# Patient Record
Sex: FEMALE | Race: BLACK OR AFRICAN AMERICAN | NOT HISPANIC OR LATINO | Employment: FULL TIME | URBAN - METROPOLITAN AREA
[De-identification: names, ages, dates, MRNs, and addresses within clinical notes are randomized per-mention and may not be internally consistent; named-entity substitution may affect disease eponyms.]

---

## 2017-01-30 ENCOUNTER — TELEPHONE (OUTPATIENT)
Dept: FAMILY MEDICINE CLINIC | Facility: CLINIC | Age: 62
End: 2017-01-30

## 2017-01-30 NOTE — TELEPHONE ENCOUNTER
Pt called wanting to know if you can write a letter stating pt medical problems for a reimbursement with [facility]? Stating that she has arthritis in knee and back, obese, and had hip replacement?

## 2019-10-29 ENCOUNTER — APPOINTMENT (OUTPATIENT)
Dept: GENERAL RADIOLOGY | Facility: HOSPITAL | Age: 64
End: 2019-10-29

## 2019-10-29 ENCOUNTER — HOSPITAL ENCOUNTER (EMERGENCY)
Facility: HOSPITAL | Age: 64
Discharge: HOME | End: 2019-10-29
Attending: EMERGENCY MEDICINE | Admitting: EMERGENCY MEDICINE

## 2019-10-29 ENCOUNTER — APPOINTMENT (OUTPATIENT)
Dept: CT IMAGING | Facility: HOSPITAL | Age: 64
End: 2019-10-29

## 2019-10-29 VITALS
RESPIRATION RATE: 16 BRPM | HEIGHT: 63 IN | SYSTOLIC BLOOD PRESSURE: 179 MMHG | HEART RATE: 75 BPM | DIASTOLIC BLOOD PRESSURE: 82 MMHG | OXYGEN SATURATION: 98 % | TEMPERATURE: 98.2 F | BODY MASS INDEX: 59.72 KG/M2
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
## 2019-10-29 DIAGNOSIS — S50.02XA CONTUSION OF LEFT ELBOW, INITIAL ENCOUNTER: ICD-10-CM

## 2019-10-29 DIAGNOSIS — S40.012A CONTUSION OF LEFT SHOULDER, INITIAL ENCOUNTER: ICD-10-CM

## 2019-10-29 DIAGNOSIS — S09.90XA INJURY OF HEAD, INITIAL ENCOUNTER: ICD-10-CM

## 2019-10-29 PROCEDURE — 70450 CT HEAD/BRAIN W/O DYE: CPT

## 2019-10-29 PROCEDURE — 73030 X-RAY EXAM OF SHOULDER: CPT

## 2019-10-29 PROCEDURE — 99283 EMERGENCY DEPT VISIT LOW MDM: CPT

## 2019-10-29 PROCEDURE — 73070 X-RAY EXAM OF ELBOW: CPT

## 2019-10-29 RX ORDER — METHOCARBAMOL 750 MG/1
750 TABLET, FILM COATED ORAL 3 TIMES DAILY
Qty: 15 TABLET | Refills: 0 | Status: SHIPPED | OUTPATIENT
Start: 2019-10-29

## 2019-10-29 RX ORDER — HYDROCODONE BITARTRATE AND ACETAMINOPHEN 7.5; 325 MG/1; MG/1
1 TABLET ORAL ONCE
Status: COMPLETED | OUTPATIENT
Start: 2019-10-29 | End: 2019-10-29

## 2019-10-29 RX ADMIN — HYDROCODONE BITARTRATE AND ACETAMINOPHEN 1 TABLET: 7.5; 325 TABLET ORAL at 22:26

## 2021-03-22 ENCOUNTER — BULK ORDERING (OUTPATIENT)
Dept: CASE MANAGEMENT | Facility: OTHER | Age: 66
End: 2021-03-22

## 2021-03-22 DIAGNOSIS — Z23 IMMUNIZATION DUE: ICD-10-CM

## 2023-11-06 ENCOUNTER — HOSPITAL ENCOUNTER (EMERGENCY)
Facility: HOSPITAL | Age: 68
Discharge: HOME | End: 2023-11-06
Attending: EMERGENCY MEDICINE | Admitting: EMERGENCY MEDICINE
Payer: MEDICARE

## 2023-11-06 VITALS — RESPIRATION RATE: 16 BRPM | HEART RATE: 61 BPM | OXYGEN SATURATION: 98 % | TEMPERATURE: 97.8 F

## 2023-11-06 DIAGNOSIS — H66.90 ACUTE OTITIS MEDIA, UNSPECIFIED OTITIS MEDIA TYPE: Primary | ICD-10-CM

## 2023-11-06 PROCEDURE — 99282 EMERGENCY DEPT VISIT SF MDM: CPT

## 2023-11-06 RX ORDER — AMOXICILLIN 875 MG/1
875 TABLET, COATED ORAL 2 TIMES DAILY
Qty: 10 TABLET | Refills: 0 | Status: SHIPPED | OUTPATIENT
Start: 2023-11-06 | End: 2023-11-11

## 2023-11-06 NOTE — Clinical Note
[facility] EMERGENCY DEPARTMENT  [address] [facility] [address]  Phone: [phone]    Saba Velez was seen and treated in our emergency department on 11/6/2023.  She may return to work on 11/07/2023.         Thank you for choosing [facility].    Sean Junior MD

## 2023-11-06 NOTE — DISCHARGE INSTRUCTIONS
Take medications as prescribed until they are gone.  Follow-up with your primary care doctor for further evaluation.  Return here immediately for any fever over 100.4, chest pain, shortness of breath, or passing out.

## 2023-11-06 NOTE — ED PROVIDER NOTES
EMERGENCY DEPARTMENT ENCOUNTER    Room Number:  13/13  PCP: Oscar Lawrence MD  Patient Care Team:  Oscar Lawrence MD as PCP - General (General Practice)  Cari Singh MD as PCP - Family Medicine   Independent Historians: Patient, family    HPI:  Chief Complaint: Left ear pain    A complete HPI/ROS/PMH/PSH/SH/FH are unobtainable due to: Nothing    Chronic or social conditions impacting patient care (Social Determinants of Health): None  (Financial Resource Strain / Food Insecurity / Transportation Needs / Physical Activity / Stress / Social Connections / Intimate Partner Violence / Housing Stability)    Context: Saba Velez is a 68 y.o. female who presents to the ED c/o acute left ear pain.  The patient reports that she has had pain in her left ear for the last week.  She reports it radiates posteriorly down into her neck.  She denies any chest pain or shortness of breath.  She denies any fever.  She reports when the pain gets severe sometimes it catches her breath.  She has not had any medicine for symptoms.  She has not been evaluated by any physician.  Nothing makes it worse or better.    Review of prior external notes (non-ED) -and- Review of prior external test results outside of this encounter: Extensive review of the EPIC system as well as [facility] reveals no prior visit notes and no prior diagnostic studies available for review.    Prescription drug monitoring program review:         PAST MEDICAL HISTORY  Active Ambulatory Problems     Diagnosis Date Noted    No Active Ambulatory Problems     Resolved Ambulatory Problems     Diagnosis Date Noted    No Resolved Ambulatory Problems     Past Medical History:   Diagnosis Date    Arthritis     Hypertension          PAST SURGICAL HISTORY  Past Surgical History:   Procedure Laterality Date    ABDOMINAL SURGERY      JOINT REPLACEMENT      R hip 2015         FAMILY HISTORY  No family history on file.      SOCIAL HISTORY  Social History      Socioeconomic History    Marital status: Single   Tobacco Use    Smoking status: Never   Substance and Sexual Activity    Alcohol use: No    Drug use: No    Sexual activity: Defer         ALLERGIES  Patient has no known allergies.        REVIEW OF SYSTEMS  Review of Systems  Included in HPI  All systems reviewed and negative except for those discussed in HPI.      PHYSICAL EXAM    I have reviewed the triage vital signs and nursing notes.    ED Triage Vitals [11/06/23 0814]   Temp Heart Rate Resp BP SpO2   97.8 °F (36.6 °C) 77 16 -- 96 %      Temp src Heart Rate Source Patient Position BP Location FiO2 (%)   Tympanic -- -- -- --       Physical Exam  GENERAL: Awake, alert, no acute distress  SKIN: Warm, dry  HENT: Normocephalic, atraumatic.  Left TM is erythematous and bulging.  Right TM is normal.  There is no significant lymphadenopathy in the posterior or anterior cervical chains.  EYES: no scleral icterus  CV: regular rhythm, regular rate  RESPIRATORY: normal effort, lungs clear  ABDOMEN: soft, nontender, nondistended  MUSCULOSKELETAL: no deformity, no calf tenderness or swelling  NEURO: alert, moves all extremities, follows commands                                                               LAB RESULTS  No results found for this or any previous visit (from the past 24 hour(s)).    Ordered the above labs and independently reviewed the results.        RADIOLOGY  No Radiology Exams Resulted Within Past 24 Hours    I ordered the above noted radiological studies. Reviewed by me and discussed with radiologist.  See dictation for official radiology interpretation.      PROCEDURES    Procedures      MEDICATIONS GIVEN IN ER    Medications - No data to display      ORDERS PLACED DURING THIS VISIT:  No orders of the defined types were placed in this encounter.        PROGRESS, DATA ANALYSIS, CONSULTS, AND MEDICAL DECISION MAKING    All labs have been independently interpreted by me.  All radiology studies have been  reviewed by me and discussed with radiologist dictating the report.   EKG's independently viewed and interpreted by me.  Discussion below represents my analysis of pertinent findings related to patient's condition, differential diagnosis, treatment plan and final disposition.    Differential diagnosis includes but is not limited to otitis media, otitis externa, cerumen impaction.    ED Course as of 11/06/23 0832   Mon Nov 06, 2023   0832 The patient has evidence of otitis media.  Her symptoms have been going on for the last week and has been unrelenting.  Plan to start antibiotics and have her follow-up with her primary care doctor.  She is agreeable. [TR]      ED Course User Index  [TR] Sean Junior MD                  AS OF 08:32 EST VITALS:    BP -    HR - 77  TEMP - 97.8 °F (36.6 °C) (Tympanic)  O2 SATS - 96%        DIAGNOSIS  Final diagnoses:   Acute otitis media, unspecified otitis media type         DISPOSITION  ED Disposition       ED Disposition   Discharge    Condition   Stable    Comment   --                  Note Disclaimer: At [facility], we believe that sharing information builds trust and better relationships. You are receiving this note because you recently visited [facility]. It is possible you will see health information before a provider has talked with you about it. This kind of information can be easy to misunderstand. To help you fully understand what it means for your health, we urge you to discuss this note with your provider.         Sean Junior MD  11/06/23 0832

## 2024-09-01 ENCOUNTER — HOSPITAL ENCOUNTER (INPATIENT)
Facility: HOSPITAL | Age: 69
LOS: 1 days | Discharge: HOME | End: 2024-09-03
Attending: EMERGENCY MEDICINE | Admitting: HOSPITALIST
Payer: MEDICARE

## 2024-09-01 ENCOUNTER — APPOINTMENT (OUTPATIENT)
Dept: GENERAL RADIOLOGY | Facility: HOSPITAL | Age: 69
End: 2024-09-01
Payer: MEDICARE

## 2024-09-01 ENCOUNTER — APPOINTMENT (OUTPATIENT)
Dept: ULTRASOUND IMAGING | Facility: HOSPITAL | Age: 69
End: 2024-09-01
Payer: MEDICARE

## 2024-09-01 DIAGNOSIS — R53.1 WEAKNESS: ICD-10-CM

## 2024-09-01 DIAGNOSIS — N18.9 CHRONIC RENAL IMPAIRMENT, UNSPECIFIED CKD STAGE: ICD-10-CM

## 2024-09-01 DIAGNOSIS — N39.0 ACUTE UTI: ICD-10-CM

## 2024-09-01 DIAGNOSIS — R11.0 INTRACTABLE NAUSEA: Primary | ICD-10-CM

## 2024-09-01 DIAGNOSIS — R63.4 WEIGHT LOSS: ICD-10-CM

## 2024-09-01 DIAGNOSIS — E87.6 HYPOKALEMIA: ICD-10-CM

## 2024-09-01 PROBLEM — M19.90 ARTHRITIS: Status: ACTIVE | Noted: 2021-08-19

## 2024-09-01 PROBLEM — N17.9 ACUTE RENAL FAILURE: Status: ACTIVE | Noted: 2024-09-01

## 2024-09-01 PROBLEM — E66.9 OBESITY: Status: ACTIVE | Noted: 2021-11-15

## 2024-09-01 PROBLEM — G47.33 OBSTRUCTIVE SLEEP APNEA: Status: ACTIVE | Noted: 2024-09-01

## 2024-09-01 PROBLEM — R82.71 ASYMPTOMATIC BACTERIURIA: Status: ACTIVE | Noted: 2024-09-01

## 2024-09-01 PROBLEM — R68.81 EARLY SATIETY: Status: ACTIVE | Noted: 2024-09-01

## 2024-09-01 PROBLEM — I10 HYPERTENSION: Status: ACTIVE | Noted: 2024-09-01

## 2024-09-01 PROBLEM — R73.9 HYPERGLYCEMIA: Status: ACTIVE | Noted: 2024-09-01

## 2024-09-01 LAB
ALBUMIN SERPL-MCNC: 3.7 G/DL (ref 3.5–5.2)
ALBUMIN/GLOB SERPL: 1.3 G/DL
ALP SERPL-CCNC: 66 U/L (ref 39–117)
ALT SERPL W P-5'-P-CCNC: 25 U/L (ref 1–33)
ANION GAP SERPL CALCULATED.3IONS-SCNC: 12.3 MMOL/L (ref 5–15)
AST SERPL-CCNC: 35 U/L (ref 1–32)
BACTERIA UR QL AUTO: ABNORMAL /HPF
BASOPHILS # BLD AUTO: 0.04 10*3/MM3 (ref 0–0.2)
BASOPHILS NFR BLD AUTO: 0.5 % (ref 0–1.5)
BILIRUB SERPL-MCNC: 0.8 MG/DL (ref 0–1.2)
BILIRUB UR QL STRIP: NEGATIVE
BUN SERPL-MCNC: 25 MG/DL (ref 8–23)
BUN/CREAT SERPL: 13.7 (ref 7–25)
CALCIUM SPEC-SCNC: 9.5 MG/DL (ref 8.6–10.5)
CHLORIDE SERPL-SCNC: 94 MMOL/L (ref 98–107)
CLARITY UR: ABNORMAL
CO2 SERPL-SCNC: 32.7 MMOL/L (ref 22–29)
COLOR UR: YELLOW
CREAT SERPL-MCNC: 1.82 MG/DL (ref 0.57–1)
CREAT UR-MCNC: 172.1 MG/DL
D-LACTATE SERPL-SCNC: 1.6 MMOL/L (ref 0.5–2)
DEPRECATED RDW RBC AUTO: 42.4 FL (ref 37–54)
EGFRCR SERPLBLD CKD-EPI 2021: 29.4 ML/MIN/1.73
EOSINOPHIL # BLD AUTO: 0.08 10*3/MM3 (ref 0–0.4)
EOSINOPHIL NFR BLD AUTO: 1 % (ref 0.3–6.2)
EOSINOPHIL SPEC QL MICRO: 0 % EOS/100 CELLS (ref 0–0)
ERYTHROCYTE [DISTWIDTH] IN BLOOD BY AUTOMATED COUNT: 12.6 % (ref 12.3–15.4)
GEN 5 2HR TROPONIN T REFLEX: 28 NG/L
GLOBULIN UR ELPH-MCNC: 2.9 GM/DL
GLUCOSE SERPL-MCNC: 110 MG/DL (ref 65–99)
GLUCOSE UR STRIP-MCNC: NEGATIVE MG/DL
HCT VFR BLD AUTO: 34.5 % (ref 34–46.6)
HGB BLD-MCNC: 12 G/DL (ref 12–15.9)
HGB UR QL STRIP.AUTO: ABNORMAL
HOLD SPECIMEN: NORMAL
HOLD SPECIMEN: NORMAL
HYALINE CASTS UR QL AUTO: ABNORMAL /LPF
IMM GRANULOCYTES # BLD AUTO: 0.02 10*3/MM3 (ref 0–0.05)
IMM GRANULOCYTES NFR BLD AUTO: 0.3 % (ref 0–0.5)
KETONES UR QL STRIP: NEGATIVE
LEUKOCYTE ESTERASE UR QL STRIP.AUTO: ABNORMAL
LIPASE SERPL-CCNC: 13 U/L (ref 13–60)
LYMPHOCYTES # BLD AUTO: 1.39 10*3/MM3 (ref 0.7–3.1)
LYMPHOCYTES NFR BLD AUTO: 17.5 % (ref 19.6–45.3)
MAGNESIUM SERPL-MCNC: 2.4 MG/DL (ref 1.6–2.4)
MCH RBC QN AUTO: 32.4 PG (ref 26.6–33)
MCHC RBC AUTO-ENTMCNC: 34.8 G/DL (ref 31.5–35.7)
MCV RBC AUTO: 93.2 FL (ref 79–97)
MONOCYTES # BLD AUTO: 0.65 10*3/MM3 (ref 0.1–0.9)
MONOCYTES NFR BLD AUTO: 8.2 % (ref 5–12)
NEUTROPHILS NFR BLD AUTO: 5.76 10*3/MM3 (ref 1.7–7)
NEUTROPHILS NFR BLD AUTO: 72.5 % (ref 42.7–76)
NITRITE UR QL STRIP: POSITIVE
NRBC BLD AUTO-RTO: 0 /100 WBC (ref 0–0.2)
OSMOLALITY UR: 318 MOSM/KG
PH UR STRIP.AUTO: 5.5 [PH] (ref 5–8)
PLATELET # BLD AUTO: 306 10*3/MM3 (ref 140–450)
PMV BLD AUTO: 9 FL (ref 6–12)
POTASSIUM SERPL-SCNC: 2.2 MMOL/L (ref 3.5–5.2)
PROT SERPL-MCNC: 6.6 G/DL (ref 6–8.5)
PROT UR QL STRIP: ABNORMAL
QT INTERVAL: 453 MS
QTC INTERVAL: 445 MS
RBC # BLD AUTO: 3.7 10*6/MM3 (ref 3.77–5.28)
RBC # UR STRIP: ABNORMAL /HPF
REF LAB TEST METHOD: ABNORMAL
SODIUM SERPL-SCNC: 139 MMOL/L (ref 136–145)
SODIUM UR-SCNC: 38 MMOL/L
SP GR UR STRIP: 1.01 (ref 1–1.03)
SQUAMOUS #/AREA URNS HPF: ABNORMAL /HPF
T4 FREE SERPL-MCNC: 1.16 NG/DL (ref 0.92–1.68)
TROPONIN T DELTA: -2 NG/L
TROPONIN T SERPL HS-MCNC: 30 NG/L
TSH SERPL DL<=0.05 MIU/L-ACNC: 1.14 UIU/ML (ref 0.27–4.2)
URATE SERPL-MCNC: 12.2 MG/DL (ref 2.4–5.7)
UROBILINOGEN UR QL STRIP: ABNORMAL
UUN 24H UR-MCNC: 368 MG/DL
WBC # UR STRIP: ABNORMAL /HPF
WBC NRBC COR # BLD AUTO: 7.94 10*3/MM3 (ref 3.4–10.8)
WHOLE BLOOD HOLD COAG: NORMAL
WHOLE BLOOD HOLD SPECIMEN: NORMAL

## 2024-09-01 PROCEDURE — 87186 SC STD MICRODIL/AGAR DIL: CPT | Performed by: EMERGENCY MEDICINE

## 2024-09-01 PROCEDURE — 81001 URINALYSIS AUTO W/SCOPE: CPT | Performed by: EMERGENCY MEDICINE

## 2024-09-01 PROCEDURE — 84484 ASSAY OF TROPONIN QUANT: CPT | Performed by: EMERGENCY MEDICINE

## 2024-09-01 PROCEDURE — 82570 ASSAY OF URINE CREATININE: CPT | Performed by: HOSPITALIST

## 2024-09-01 PROCEDURE — 80053 COMPREHEN METABOLIC PANEL: CPT | Performed by: EMERGENCY MEDICINE

## 2024-09-01 PROCEDURE — 99285 EMERGENCY DEPT VISIT HI MDM: CPT

## 2024-09-01 PROCEDURE — 84443 ASSAY THYROID STIM HORMONE: CPT | Performed by: EMERGENCY MEDICINE

## 2024-09-01 PROCEDURE — 83935 ASSAY OF URINE OSMOLALITY: CPT | Performed by: HOSPITALIST

## 2024-09-01 PROCEDURE — 25010000002 CEFTRIAXONE PER 250 MG: Performed by: EMERGENCY MEDICINE

## 2024-09-01 PROCEDURE — 87088 URINE BACTERIA CULTURE: CPT | Performed by: EMERGENCY MEDICINE

## 2024-09-01 PROCEDURE — 85025 COMPLETE CBC W/AUTO DIFF WBC: CPT | Performed by: EMERGENCY MEDICINE

## 2024-09-01 PROCEDURE — 93010 ELECTROCARDIOGRAM REPORT: CPT | Performed by: INTERNAL MEDICINE

## 2024-09-01 PROCEDURE — 87086 URINE CULTURE/COLONY COUNT: CPT | Performed by: EMERGENCY MEDICINE

## 2024-09-01 PROCEDURE — 71045 X-RAY EXAM CHEST 1 VIEW: CPT

## 2024-09-01 PROCEDURE — 84439 ASSAY OF FREE THYROXINE: CPT | Performed by: EMERGENCY MEDICINE

## 2024-09-01 PROCEDURE — 25010000002 HEPARIN (PORCINE) PER 1000 UNITS: Performed by: HOSPITALIST

## 2024-09-01 PROCEDURE — 36415 COLL VENOUS BLD VENIPUNCTURE: CPT

## 2024-09-01 PROCEDURE — 83690 ASSAY OF LIPASE: CPT | Performed by: EMERGENCY MEDICINE

## 2024-09-01 PROCEDURE — 84550 ASSAY OF BLOOD/URIC ACID: CPT | Performed by: HOSPITALIST

## 2024-09-01 PROCEDURE — 76705 ECHO EXAM OF ABDOMEN: CPT

## 2024-09-01 PROCEDURE — 83735 ASSAY OF MAGNESIUM: CPT | Performed by: EMERGENCY MEDICINE

## 2024-09-01 PROCEDURE — 84540 ASSAY OF URINE/UREA-N: CPT | Performed by: HOSPITALIST

## 2024-09-01 PROCEDURE — G0378 HOSPITAL OBSERVATION PER HR: HCPCS

## 2024-09-01 PROCEDURE — 83605 ASSAY OF LACTIC ACID: CPT | Performed by: EMERGENCY MEDICINE

## 2024-09-01 PROCEDURE — 25810000003 SODIUM CHLORIDE 0.9 % SOLUTION: Performed by: EMERGENCY MEDICINE

## 2024-09-01 PROCEDURE — 84300 ASSAY OF URINE SODIUM: CPT | Performed by: HOSPITALIST

## 2024-09-01 PROCEDURE — 87205 SMEAR GRAM STAIN: CPT | Performed by: HOSPITALIST

## 2024-09-01 PROCEDURE — 93005 ELECTROCARDIOGRAM TRACING: CPT | Performed by: EMERGENCY MEDICINE

## 2024-09-01 RX ORDER — POLYETHYLENE GLYCOL 3350 17 G/17G
17 POWDER, FOR SOLUTION ORAL DAILY PRN
Status: DISCONTINUED | OUTPATIENT
Start: 2024-09-01 | End: 2024-09-03 | Stop reason: HOSPADM

## 2024-09-01 RX ORDER — BISACODYL 10 MG
10 SUPPOSITORY, RECTAL RECTAL DAILY PRN
Status: DISCONTINUED | OUTPATIENT
Start: 2024-09-01 | End: 2024-09-03 | Stop reason: HOSPADM

## 2024-09-01 RX ORDER — POTASSIUM CHLORIDE 750 MG/1
40 TABLET, FILM COATED, EXTENDED RELEASE ORAL ONCE
Status: COMPLETED | OUTPATIENT
Start: 2024-09-01 | End: 2024-09-01

## 2024-09-01 RX ORDER — VIT C/ZINC CITRATE/ELDERBERRY 45 MG-50MG
TABLET,CHEWABLE ORAL
COMMUNITY

## 2024-09-01 RX ORDER — ACETAMINOPHEN 160 MG/5ML
650 SOLUTION ORAL EVERY 4 HOURS PRN
Status: DISCONTINUED | OUTPATIENT
Start: 2024-09-01 | End: 2024-09-03 | Stop reason: HOSPADM

## 2024-09-01 RX ORDER — SODIUM CHLORIDE 0.9 % (FLUSH) 0.9 %
10 SYRINGE (ML) INJECTION EVERY 12 HOURS SCHEDULED
Status: DISCONTINUED | OUTPATIENT
Start: 2024-09-01 | End: 2024-09-03 | Stop reason: HOSPADM

## 2024-09-01 RX ORDER — POTASSIUM CHLORIDE 750 MG/1
40 TABLET, FILM COATED, EXTENDED RELEASE ORAL ONCE
Status: COMPLETED | OUTPATIENT
Start: 2024-09-02 | End: 2024-09-01

## 2024-09-01 RX ORDER — ACETAMINOPHEN 650 MG/1
650 SUPPOSITORY RECTAL EVERY 4 HOURS PRN
Status: DISCONTINUED | OUTPATIENT
Start: 2024-09-01 | End: 2024-09-03 | Stop reason: HOSPADM

## 2024-09-01 RX ORDER — METOPROLOL SUCCINATE 25 MG/1
1 TABLET, EXTENDED RELEASE ORAL DAILY
COMMUNITY
Start: 2024-04-16

## 2024-09-01 RX ORDER — AMOXICILLIN 250 MG
2 CAPSULE ORAL 2 TIMES DAILY PRN
Status: DISCONTINUED | OUTPATIENT
Start: 2024-09-01 | End: 2024-09-03 | Stop reason: HOSPADM

## 2024-09-01 RX ORDER — SODIUM CHLORIDE 0.9 % (FLUSH) 0.9 %
10 SYRINGE (ML) INJECTION AS NEEDED
Status: DISCONTINUED | OUTPATIENT
Start: 2024-09-01 | End: 2024-09-03 | Stop reason: HOSPADM

## 2024-09-01 RX ORDER — ONDANSETRON 2 MG/ML
4 INJECTION INTRAMUSCULAR; INTRAVENOUS EVERY 6 HOURS PRN
Status: DISCONTINUED | OUTPATIENT
Start: 2024-09-01 | End: 2024-09-03 | Stop reason: HOSPADM

## 2024-09-01 RX ORDER — METOPROLOL SUCCINATE 25 MG/1
25 TABLET, EXTENDED RELEASE ORAL DAILY
Status: DISCONTINUED | OUTPATIENT
Start: 2024-09-01 | End: 2024-09-03

## 2024-09-01 RX ORDER — SODIUM CHLORIDE 9 MG/ML
40 INJECTION, SOLUTION INTRAVENOUS AS NEEDED
Status: DISCONTINUED | OUTPATIENT
Start: 2024-09-01 | End: 2024-09-03 | Stop reason: HOSPADM

## 2024-09-01 RX ORDER — ASPIRIN 81 MG/1
81 TABLET ORAL DAILY
Status: DISCONTINUED | OUTPATIENT
Start: 2024-09-01 | End: 2024-09-03 | Stop reason: HOSPADM

## 2024-09-01 RX ORDER — BISACODYL 5 MG/1
5 TABLET, DELAYED RELEASE ORAL DAILY PRN
Status: DISCONTINUED | OUTPATIENT
Start: 2024-09-01 | End: 2024-09-03 | Stop reason: HOSPADM

## 2024-09-01 RX ORDER — ACETAMINOPHEN 325 MG/1
650 TABLET ORAL EVERY 4 HOURS PRN
Status: DISCONTINUED | OUTPATIENT
Start: 2024-09-01 | End: 2024-09-03 | Stop reason: HOSPADM

## 2024-09-01 RX ORDER — NITROGLYCERIN 0.4 MG/1
0.4 TABLET SUBLINGUAL
Status: DISCONTINUED | OUTPATIENT
Start: 2024-09-01 | End: 2024-09-03 | Stop reason: HOSPADM

## 2024-09-01 RX ORDER — FAMOTIDINE 20 MG/1
20 TABLET, FILM COATED ORAL
Status: DISCONTINUED | OUTPATIENT
Start: 2024-09-02 | End: 2024-09-03 | Stop reason: HOSPADM

## 2024-09-01 RX ORDER — FAMOTIDINE 20 MG/1
20 TABLET, FILM COATED ORAL 3 TIMES DAILY
COMMUNITY

## 2024-09-01 RX ORDER — ASPIRIN 81 MG/1
1 TABLET ORAL DAILY
COMMUNITY

## 2024-09-01 RX ORDER — IBUPROFEN 800 MG/1
1 TABLET, FILM COATED ORAL 2 TIMES DAILY WITH MEALS
COMMUNITY
End: 2024-09-03 | Stop reason: HOSPADM

## 2024-09-01 RX ORDER — HYDROCHLOROTHIAZIDE 25 MG/1
1 TABLET ORAL DAILY
COMMUNITY
Start: 2015-03-02 | End: 2024-09-03 | Stop reason: HOSPADM

## 2024-09-01 RX ORDER — ONDANSETRON 4 MG/1
4 TABLET, ORALLY DISINTEGRATING ORAL EVERY 6 HOURS PRN
Status: DISCONTINUED | OUTPATIENT
Start: 2024-09-01 | End: 2024-09-03 | Stop reason: HOSPADM

## 2024-09-01 RX ORDER — HEPARIN SODIUM 5000 [USP'U]/ML
5000 INJECTION, SOLUTION INTRAVENOUS; SUBCUTANEOUS EVERY 12 HOURS SCHEDULED
Status: DISCONTINUED | OUTPATIENT
Start: 2024-09-01 | End: 2024-09-03 | Stop reason: HOSPADM

## 2024-09-01 RX ADMIN — SODIUM CHLORIDE 500 ML: 9 INJECTION, SOLUTION INTRAVENOUS at 16:42

## 2024-09-01 RX ADMIN — ASPIRIN 81 MG: 81 TABLET, COATED ORAL at 20:21

## 2024-09-01 RX ADMIN — METOPROLOL SUCCINATE 25 MG: 25 TABLET, EXTENDED RELEASE ORAL at 23:00

## 2024-09-01 RX ADMIN — POTASSIUM CHLORIDE 40 MEQ: 750 TABLET, EXTENDED RELEASE ORAL at 20:21

## 2024-09-01 RX ADMIN — POTASSIUM CHLORIDE 40 MEQ: 750 TABLET, EXTENDED RELEASE ORAL at 16:46

## 2024-09-01 RX ADMIN — HEPARIN SODIUM 5000 UNITS: 5000 INJECTION INTRAVENOUS; SUBCUTANEOUS at 20:21

## 2024-09-01 RX ADMIN — Medication 10 ML: at 23:01

## 2024-09-01 RX ADMIN — POTASSIUM CHLORIDE 40 MEQ: 750 TABLET, EXTENDED RELEASE ORAL at 23:01

## 2024-09-01 RX ADMIN — SERTRALINE 50 MG: 50 TABLET, FILM COATED ORAL at 23:00

## 2024-09-01 RX ADMIN — CEFTRIAXONE 2000 MG: 2 INJECTION, POWDER, FOR SOLUTION INTRAMUSCULAR; INTRAVENOUS at 16:43

## 2024-09-01 NOTE — ED NOTES
Nursing report ED to floor  Saab Velez  69 y.o.  female    HPI :  HPI (Adult)  Stated Reason for Visit: nausea, weight loss    Chief Complaint  Chief Complaint   Patient presents with    Nausea    Weight Loss       Admitting doctor:   Timothy Giang MD    Admitting diagnosis:   The primary encounter diagnosis was Intractable nausea. Diagnoses of Hypokalemia, Acute UTI, Weight loss, Weakness, and Chronic renal impairment, unspecified CKD stage were also pertinent to this visit.    Code status:   Current Code Status       Date Active Code Status Order ID Comments User Context       Not on file            Allergies:   Patient has no known allergies.    Isolation:   No active isolations    Intake and Output  No intake or output data in the 24 hours ending 09/01/24 1805    Weight:       09/01/24  1507   Weight: 111 kg (244 lb)       Most recent vitals:   Vitals:    09/01/24 1731 09/01/24 1735 09/01/24 1737 09/01/24 1801   BP: 113/77   112/63   Pulse: 57 65 65 61   Resp:       Temp:       SpO2: 98% 100% 100% 100%   Weight:       Height:           Active LDAs/IV Access:   Lines, Drains & Airways       Active LDAs       Name Placement date Placement time Site Days    Peripheral IV 09/01/24 1641 Right Antecubital 09/01/24  1641  Antecubital  less than 1                    Labs (abnormal labs have a star):   Labs Reviewed   COMPREHENSIVE METABOLIC PANEL - Abnormal; Notable for the following components:       Result Value    Glucose 110 (*)     BUN 25 (*)     Creatinine 1.82 (*)     Potassium 2.2 (*)     Chloride 94 (*)     CO2 32.7 (*)     AST (SGOT) 35 (*)     eGFR 29.4 (*)     All other components within normal limits    Narrative:     GFR Normal >60  Chronic Kidney Disease <60  Kidney Failure <15    The GFR formula is only valid for adults with stable renal function between ages 18 and 70.   URINALYSIS W/ MICROSCOPIC IF INDICATED (NO CULTURE) - Abnormal; Notable for the following components:    Appearance, UA  Turbid (*)     Blood, UA Trace (*)     Protein, UA 30 mg/dL (1+) (*)     Leuk Esterase, UA Large (3+) (*)     Nitrite, UA Positive (*)     All other components within normal limits   CBC WITH AUTO DIFFERENTIAL - Abnormal; Notable for the following components:    RBC 3.70 (*)     Lymphocyte % 17.5 (*)     All other components within normal limits   URINALYSIS, MICROSCOPIC ONLY - Abnormal; Notable for the following components:    RBC, UA 11-20 (*)     WBC, UA Too Numerous to Count (*)     Bacteria, UA 4+ (*)     Squamous Epithelial Cells, UA 13-20 (*)     All other components within normal limits   TROPONIN - Abnormal; Notable for the following components:    HS Troponin T 30 (*)     All other components within normal limits    Narrative:     High Sensitive Troponin T Reference Range:  <14.0 ng/L- Negative Female for AMI  <22.0 ng/L- Negative Male for AMI  >=14 - Abnormal Female indicating possible myocardial injury.  >=22 - Abnormal Male indicating possible myocardial injury.   Clinicians would have to utilize clinical acumen, EKG, Troponin, and serial changes to determine if it is an Acute Myocardial Infarction or myocardial injury due to an underlying chronic condition.        LIPASE - Normal   LACTIC ACID, PLASMA - Normal   MAGNESIUM - Normal   TSH - Normal   T4, FREE - Normal   URINE CULTURE   RAINBOW DRAW    Narrative:     The following orders were created for panel order [place] Draw.  Procedure                               Abnormality         Status                     ---------                               -----------         ------                     Green Top (Gel)[[id number]]                                  Final result               Lavender Top[[id number]]                                     Final result               Gold Top - SST[[id number]]                                   Final result               Light Blue Top[[id number]]                                   Final result                 Please view  results for these tests on the individual orders.   HIGH SENSITIVITIY TROPONIN T 2HR   CBC AND DIFFERENTIAL    Narrative:     The following orders were created for panel order CBC & Differential.  Procedure                               Abnormality         Status                     ---------                               -----------         ------                     CBC Auto Differential[[id number]]        Abnormal            Final result                 Please view results for these tests on the individual orders.   GREEN TOP   LAVENDER TOP   GOLD TOP - SST   LIGHT BLUE TOP       EKG:   ECG 12 Lead Electrolyte Imbalance   Preliminary Result   HEART RATE=58  bpm   RR Lndvfsgg=4629  ms   MS Sglumtjo=362  ms   P Horizontal Axis=1  deg   P Front Axis=-10  deg   QRSD Mceeouoe=428  ms   QT Bpsrdpzp=996  ms   BDtM=357  ms   QRS Axis=19  deg   T Wave Axis=-6  deg   - ABNORMAL ECG -   Sinus rhythm   Prolonged MS interval   Low voltage, precordial leads   Date and Time of Study:2024-09-01 17:01:55          Meds given in ED:   Medications   sodium chloride 0.9 % flush 10 mL (has no administration in time range)   Potassium Replacement - Follow Nurse / BPA Driven Protocol (has no administration in time range)   sodium chloride 0.9 % bolus 500 mL (500 mL Intravenous New Bag 9/1/24 1642)   cefTRIAXone (ROCEPHIN) 2,000 mg in sodium chloride 0.9 % 100 mL MBP (2,000 mg Intravenous New Bag 9/1/24 1643)   potassium chloride (K-DUR,KLOR-CON) ER tablet 40 mEq (40 mEq Oral Given 9/1/24 1646)       Imaging results:  No radiology results for the last day    Ambulatory status:   - w/ assist    Social issues:   Social History     Socioeconomic History    Marital status: Single   Tobacco Use    Smoking status: Never   Substance and Sexual Activity    Alcohol use: No    Drug use: No    Sexual activity: Defer       Peripheral Neurovascular  Peripheral Neurovascular (Adult)  Peripheral Neurovascular WDL: WDL    Neuro Cognitive  Neuro Cognitive  (Adult)  Cognitive/Neuro/Behavioral WDL: WDL    Learning  Learning Assessment (Adult)  Learning Readiness and Ability: no barriers identified    Respiratory  Respiratory (Adult)  Airway WDL: WDL  Respiratory WDL  Respiratory WDL: WDL    Abdominal Pain       Pain Assessments  Pain (Adult)  (0-10) Pain Rating: Rest: 0  (0-10) Pain Rating: Activity: 0    NIH Stroke Scale       Wesley Washington RN  09/01/24 18:05 EDT

## 2024-09-01 NOTE — H&P
Name: Saba eVlez ADMIT: 2024   : 1955  PCP: Oscar Lawrence MD    MRN: [id number] LOS: 0 days   AGE/SEX: 69 y.o. female  ROOM: [facility]/     Chief Complaint   Patient presents with    Nausea    Weight Loss       Subjective   Patient is a 69 y.o. female who presents to [facility] with the above chief complaint.  She presents today because she felt like she needed to have someone else evaluate her problems.  She seen her primary care doctor 5 or 6 times this year for these complaints.  She recently had a CT scan done outpatient.  She is not happy with the workup has been provided and the answer she has been given so she presented to the emergency room this evening.  She has lost about 60 or more pounds since January of this year.  She describes early satiety where she eats a couple bites and then is just too full to eat anymore.  She denies having much of an appetite for anything really.  Has been increasingly weak and not been getting around as well due to the weakness.  She stopped driving she has been confused at times.  She denies abdominal discomfort other than just a nagging aching sensation.  She denies any history of chronic kidney disease denies any history of coronary artery disease stroke or diabetes.  She takes HCTZ for blood pressure and takes ibuprofen 3 times daily for her arthritis.  In the ER she was found to have low potassium and was admitted to our service for stabilization and evaluation      History of Present Illness    Past Medical History:   Diagnosis Date    Arthritis     Hypertension      Past Surgical History:   Procedure Laterality Date    ABDOMINAL SURGERY      JOINT REPLACEMENT      R hip 2015     History reviewed. No pertinent family history.  Social History     Tobacco Use    Smoking status: Never     Passive exposure: Never    Smokeless tobacco: Never   Vaping Use    Vaping status: Never Used   Substance Use Topics    Alcohol use: No    Drug use: No      Medications Prior to Admission   Medication Sig Dispense Refill Last Dose    Ascorbic Acid (VITAMIN C PO) Take  by mouth.   8/31/2024    aspirin 81 MG EC tablet Take 1 tablet by mouth Daily.   8/31/2024    Elderberry-Vitamin C-Zinc ([employer]) 50-45-3.8 MG chewable tablet Chew.   8/31/2024    famotidine (PEPCID) 20 MG tablet Take 1 tablet by mouth 3 times a day.   8/31/2024    hydroCHLOROthiazide 25 MG tablet Take 1 tablet by mouth Daily.   8/31/2024    ibuprofen (ADVIL,MOTRIN) 800 MG tablet Take 1 tablet by mouth 2 (Two) Times a Day With Meals.   8/31/2024    metoprolol succinate XL (TOPROL-XL) 25 MG 24 hr tablet Take 1 tablet by mouth Daily.   8/31/2024    multivitamin with minerals (ONE-A-DAY WOMENS PO) Take 1 tablet by mouth Daily.   8/31/2024    sertraline (ZOLOFT) 50 MG tablet Take 1 tablet by mouth Daily.   8/31/2024    methocarbamol (ROBAXIN) 750 MG tablet Take 1 tablet by mouth 3 (Three) Times a Day. (Patient not taking: Reported on 9/1/2024) 15 tablet 0 Not Taking     Allergies:  Patient has no known allergies.    Review of Systems     Objective    Vital Signs  Temp:  [98.1 °F (36.7 °C)-98.2 °F (36.8 °C)] 98.2 °F (36.8 °C)  Heart Rate:  [57-97] 68  Resp:  [16-18] 18  BP: ()/(51-84) 113/70  SpO2:  [98 %-100 %] 100 %  on   ;   Device (Oxygen Therapy): room air  Body mass index is 41.1 kg/m².    Physical Exam    Results Review:   I reviewed the patient's new clinical results.  Results from last 7 days   Lab Units 09/01/24  1524   WBC 10*3/mm3 7.94   HEMOGLOBIN g/dL 12.0   PLATELETS 10*3/mm3 306     Results from last 7 days   Lab Units 09/01/24  1524   SODIUM mmol/L 139   POTASSIUM mmol/L 2.2*   CHLORIDE mmol/L 94*   CO2 mmol/L 32.7*   BUN mg/dL 25*   CREATININE mg/dL 1.82*   GLUCOSE mg/dL 110*   ALBUMIN g/dL 3.7   BILIRUBIN mg/dL 0.8   ALK PHOS U/L 66   AST (SGOT) U/L 35*   ALT (SGPT) U/L 25   Estimated Creatinine Clearance: 34.2 mL/min (A) (by C-G formula based on SCr of 1.82  "mg/dL (H)).  Results from last 7 days   Lab Units 09/01/24  1831 09/01/24  1524   HSTROP T ng/L 28* 30*         Invalid input(s): \"LDLCALC\"  Results from last 7 days   Lab Units 09/01/24  1533   NITRITE UA  Positive*   WBC UA /HPF Too Numerous to Count*   BACTERIA UA /HPF 4+*   SQUAM EPITHEL UA /HPF 13-20*     XR Chest 1 View   Final Result      NM HIDA SCAN WITH PHARMACOLOGICAL INTERVENTION    (Results Pending)   US Gallbladder    (Results Pending)     Assessment & Plan       Hypokalemia    Asymptomatic bacteriuria    Acute renal failure    Hyperglycemia    Obstructive sleep apnea    Weight loss    Early satiety      Assessment & Plan  This is a 69-year-old lady with a history of hypertension obesity arthritis who presents to the hospital with weight loss and abdominal issues was found to have hypokalemia  -Work to replace potassium.  -Not really sure what to think about her elevated creatinine.  I feel that this is probably acute renal failure.  Just a few days ago her creatinine was 2.8 and is down to 1.8 today.  Of asked her to avoid ibuprofen and will continue to trend her creatinine here in the hospital.  Of also asked nephrology to evaluate the patient and will plan to avoid any other nephrotoxic medications.  Will check urine electrolytes.  CT scan 3 days ago did not show any urinary obstruction.  -As far as the abdominal discomfort weight loss goes we will check a HIDA scan.  Her CT scan a few days ago did show some sludge and stones within the gallbladder.  It certainly possible the biliary colic could be causing some of her symptoms.  It is also possibly related to gastroparesis or other significant findings.  She will need outpatient referral to GI for EGD  -Mechanical DVT prophylaxis  -Full code      I discussed the patients findings and my recommendations with patient, family, and nursing staff.          Timothy Giang MD  [facility]  09/01/24  19:48 EDT      "

## 2024-09-01 NOTE — ED NOTES
"Pt c/o generalized fatigue and weakness x6-7 months, also reports decreased food intake (states .5-1 meal a day). Has seen PCP for same problem, states \"one of my blood numbers was too off so they couldn't give me what they wanted\". Pt ambulatory at home \"to get around\", but states otherwise unable to walk much. Denies any acute symptoms at this time.   "

## 2024-09-01 NOTE — ED PROVIDER NOTES
EMERGENCY DEPARTMENT ENCOUNTER    Room Number:  35/35  Date seen:  9/1/2024  PCP: Oscar Lawrence MD  Historian: Patient      HPI:  Chief Complaint: Nausea and weight loss  Context: Saba Velez is a 69 y.o. female who presents to the ED c/o nausea and weight loss.  She states she is lost 65 pounds since January.  Patient now states she is feeling fatigued.  The patient denies any abdominal pain, fevers or chills.      PAST MEDICAL HISTORY  Active Ambulatory Problems     Diagnosis Date Noted    No Active Ambulatory Problems     Resolved Ambulatory Problems     Diagnosis Date Noted    No Resolved Ambulatory Problems     Past Medical History:   Diagnosis Date    Arthritis     Hypertension          REVIEW OF SYSTEMS  All systems reviewed and negative except for those discussed in HPI.       PAST SURGICAL HISTORY  Past Surgical History:   Procedure Laterality Date    ABDOMINAL SURGERY      JOINT REPLACEMENT      R hip 2015         FAMILY HISTORY  No family history on file.      SOCIAL HISTORY  Social History     Socioeconomic History    Marital status: Single   Tobacco Use    Smoking status: Never   Substance and Sexual Activity    Alcohol use: No    Drug use: No    Sexual activity: Defer         ALLERGIES  Patient has no known allergies.      PHYSICAL EXAM  ED Triage Vitals   Temp Heart Rate Resp BP SpO2   09/01/24 1507 09/01/24 1507 09/01/24 1507 09/01/24 1535 09/01/24 1507   98.1 °F (36.7 °C) 97 16 133/84 98 %      Temp src Heart Rate Source Patient Position BP Location FiO2 (%)   -- -- -- -- --              Physical Exam      GENERAL: 69-year-old female patient in no acute distress  HENT: NCAT: nares patent: Neck supple  EYES: no scleral icterus  CV: regular rhythm, normal rate  RESPIRATORY: normal effort  ABDOMEN: soft, NTND: Bowel sounds positive  MUSCULOSKELETAL: no deformity  NEURO: alert with nonfocal neuro exam  PSYCH:  calm, cooperative  SKIN: warm, dry    Vital signs and nursing notes  reviewed.      LAB RESULTS  Recent Results (from the past 24 hour(s))   Comprehensive Metabolic Panel    Collection Time: 09/01/24  3:24 PM    Specimen: Arm, Left; Blood   Result Value Ref Range    Glucose 110 (H) 65 - 99 mg/dL    BUN 25 (H) 8 - 23 mg/dL    Creatinine 1.82 (H) 0.57 - 1.00 mg/dL    Sodium 139 136 - 145 mmol/L    Potassium 2.2 (C) 3.5 - 5.2 mmol/L    Chloride 94 (L) 98 - 107 mmol/L    CO2 32.7 (H) 22.0 - 29.0 mmol/L    Calcium 9.5 8.6 - 10.5 mg/dL    Total Protein 6.6 6.0 - 8.5 g/dL    Albumin 3.7 3.5 - 5.2 g/dL    ALT (SGPT) 25 1 - 33 U/L    AST (SGOT) 35 (H) 1 - 32 U/L    Alkaline Phosphatase 66 39 - 117 U/L    Total Bilirubin 0.8 0.0 - 1.2 mg/dL    Globulin 2.9 gm/dL    A/G Ratio 1.3 g/dL    BUN/Creatinine Ratio 13.7 7.0 - 25.0    Anion Gap 12.3 5.0 - 15.0 mmol/L    eGFR 29.4 (L) >60.0 mL/min/1.73   Lipase    Collection Time: 09/01/24  3:24 PM    Specimen: Arm, Left; Blood   Result Value Ref Range    Lipase 13 13 - 60 U/L   Lactic Acid, Plasma    Collection Time: 09/01/24  3:24 PM    Specimen: Arm, Left; Blood   Result Value Ref Range    Lactate 1.6 0.5 - 2.0 mmol/L   Green Top (Gel)    Collection Time: 09/01/24  3:24 PM   Result Value Ref Range    Extra Tube Hold for add-ons.    Lavender Top    Collection Time: 09/01/24  3:24 PM   Result Value Ref Range    Extra Tube hold for add-on    Gold Top - SST    Collection Time: 09/01/24  3:24 PM   Result Value Ref Range    Extra Tube Hold for add-ons.    Light Blue Top    Collection Time: 09/01/24  3:24 PM   Result Value Ref Range    Extra Tube Hold for add-ons.    CBC Auto Differential    Collection Time: 09/01/24  3:24 PM    Specimen: Arm, Left; Blood   Result Value Ref Range    WBC 7.94 3.40 - 10.80 10*3/mm3    RBC 3.70 (L) 3.77 - 5.28 10*6/mm3    Hemoglobin 12.0 12.0 - 15.9 g/dL    Hematocrit 34.5 34.0 - 46.6 %    MCV 93.2 79.0 - 97.0 fL    MCH 32.4 26.6 - 33.0 pg    MCHC 34.8 31.5 - 35.7 g/dL    RDW 12.6 12.3 - 15.4 %    RDW-SD 42.4 37.0 - 54.0 fl     MPV 9.0 6.0 - 12.0 fL    Platelets 306 140 - 450 10*3/mm3    Neutrophil % 72.5 42.7 - 76.0 %    Lymphocyte % 17.5 (L) 19.6 - 45.3 %    Monocyte % 8.2 5.0 - 12.0 %    Eosinophil % 1.0 0.3 - 6.2 %    Basophil % 0.5 0.0 - 1.5 %    Immature Grans % 0.3 0.0 - 0.5 %    Neutrophils, Absolute 5.76 1.70 - 7.00 10*3/mm3    Lymphocytes, Absolute 1.39 0.70 - 3.10 10*3/mm3    Monocytes, Absolute 0.65 0.10 - 0.90 10*3/mm3    Eosinophils, Absolute 0.08 0.00 - 0.40 10*3/mm3    Basophils, Absolute 0.04 0.00 - 0.20 10*3/mm3    Immature Grans, Absolute 0.02 0.00 - 0.05 10*3/mm3    nRBC 0.0 0.0 - 0.2 /100 WBC   Magnesium    Collection Time: 09/01/24  3:24 PM    Specimen: Arm, Left; Blood   Result Value Ref Range    Magnesium 2.4 1.6 - 2.4 mg/dL   TSH    Collection Time: 09/01/24  3:24 PM    Specimen: Arm, Left; Blood   Result Value Ref Range    TSH 1.140 0.270 - 4.200 uIU/mL   T4, Free    Collection Time: 09/01/24  3:24 PM    Specimen: Arm, Left; Blood   Result Value Ref Range    Free T4 1.16 0.92 - 1.68 ng/dL   High Sensitivity Troponin T    Collection Time: 09/01/24  3:24 PM    Specimen: Arm, Left; Blood   Result Value Ref Range    HS Troponin T 30 (H) <14 ng/L   Urinalysis With Microscopic If Indicated (No Culture) - Urine, Clean Catch    Collection Time: 09/01/24  3:33 PM    Specimen: Urine, Clean Catch   Result Value Ref Range    Color, UA Yellow Yellow, Straw    Appearance, UA Turbid (A) Clear    pH, UA 5.5 5.0 - 8.0    Specific Gravity, UA 1.013 1.005 - 1.030    Glucose, UA Negative Negative    Ketones, UA Negative Negative    Bilirubin, UA Negative Negative    Blood, UA Trace (A) Negative    Protein, UA 30 mg/dL (1+) (A) Negative    Leuk Esterase, UA Large (3+) (A) Negative    Nitrite, UA Positive (A) Negative    Urobilinogen, UA 0.2 E.U./dL 0.2 - 1.0 E.U./dL   Urinalysis, Microscopic Only - Urine, Clean Catch    Collection Time: 09/01/24  3:33 PM    Specimen: Urine, Clean Catch   Result Value Ref Range    RBC, UA 11-20 (A)  None Seen, 0-2 /HPF    WBC, UA Too Numerous to Count (A) None Seen, 0-2 /HPF    Bacteria, UA 4+ (A) None Seen /HPF    Squamous Epithelial Cells, UA 13-20 (A) None Seen, 0-2 /HPF    Hyaline Casts, UA 7-12 None Seen /LPF    Methodology Automated Microscopy    ECG 12 Lead Electrolyte Imbalance    Collection Time: 09/01/24  5:01 PM   Result Value Ref Range    QT Interval 453 ms    QTC Interval 445 ms       Ordered the above labs and reviewed the results.        RADIOLOGY  XR Chest 1 View    Result Date: 9/1/2024  XR CHEST 1 VW-  HISTORY: 69-year-old female with shortness of breath. Nausea and weight loss.  FINDINGS: Limited apical lordotic projection. No definite pneumonia or CHF is seen. Follow-up with 2 views of the chest is recommended.  This report was finalized on 9/1/2024 4:42 PM by Dr. Altagracia Garcia M.D on Workstation: [id number]       Ordered the above noted radiological studies. Reviewed by me in PACS.            PROCEDURES  Procedures          MEDICATIONS GIVEN IN ER  Medications   sodium chloride 0.9 % flush 10 mL (has no administration in time range)   Potassium Replacement - Follow Nurse / BPA Driven Protocol (has no administration in time range)   sodium chloride 0.9 % bolus 500 mL (0 mL Intravenous Stopped 9/1/24 1825)   cefTRIAXone (ROCEPHIN) 2,000 mg in sodium chloride 0.9 % 100 mL MBP (0 mg Intravenous Stopped 9/1/24 1825)   potassium chloride (K-DUR,KLOR-CON) ER tablet 40 mEq (40 mEq Oral Given 9/1/24 1646)             MEDICAL DECISION MAKING, PROGRESS, and CONSULTS    All labs have been independently reviewed by me.  All radiology studies have been reviewed by me and I have also reviewed the radiology report.   EKG's independently viewed and interpreted by me.  Discussion below represents my analysis of pertinent findings related to patient's condition, differential diagnosis, treatment plan and final disposition.      Additional sources:  - Discussed/ obtained information from independent  historians: The patient's daughter is here who states that the patient has not been eating well for months    - External (non-ED) record review: I reviewed a creatinine on the patient from several days ago that was elevated at 2.8    - Chronic or social conditions impacting care: Patient is from home    - Shared decision making: After shared decision-making discussion we agree she needs to be admitted to the hospital for further evaluation and care      Orders placed during this visit:  Orders Placed This Encounter   Procedures    Urine Culture - Urine,    XR Chest 1 View    NM HIDA SCAN WITH PHARMACOLOGICAL INTERVENTION    US Gallbladder    Cameron Draw    Comprehensive Metabolic Panel    Lipase    Urinalysis With Microscopic If Indicated (No Culture) - Urine, Clean Catch    Lactic Acid, Plasma    CBC Auto Differential    Urinalysis, Microscopic Only - Urine, Clean Catch    Magnesium    TSH    T4, Free    High Sensitivity Troponin T    High Sensitivity Troponin T 2Hr    NPO Diet NPO Type: Strict NPO    Undress & Gown    Monitor Blood Pressure    Continuous Pulse Oximetry    Code Status and Medical Interventions: CPR (Attempt to Resuscitate); Full Support    LHA (on-call MD unless specified) Details    Inpatient Nephrology Consult    ECG 12 Lead Electrolyte Imbalance    Insert Peripheral IV    Initiate Observation Status    CBC & Differential    Green Top (Gel)    Lavender Top    Gold Top - SST    Light Blue Top           Independent interpretation of labs, radiology studies, and discussions with consultants:  ED Course as of 09/01/24 1838   Sun Sep 01, 2024   1614 The patient does have an acute UTI.  I will send off a urine culture and treat her with Rocephin.  Her white count and lactic acid are normal. [GP]   1626 The patient's potassium is 2.2.  I will give her oral potassium and IV potassium per the electrolyte replacement protocol.  I will check an EKG for this. [GP]   1652 My independent interpretation of the  patient's chest x-ray is no CHF or pneumonia [GP]   1709 EKG    EKG time: 1701  Rhythm/Rate: Normal sinus rhythm at 58  Low voltage  Normal intervals  No Acute Ischemia  Non-Specific ST-T changes  No old EKG for comparison    Interpreted Contemporaneously by me.  Independently viewed by me     [GP]   1657 I discussed the case with Dr. Gonzalez to admit the patient to a telemetry bed for her UTI and hypokalemia. [GP]      ED Course User Index  [GP] Hermann Hernandez MD               DIAGNOSIS  Final diagnoses:   Intractable nausea   Hypokalemia   Acute UTI   Weight loss   Weakness   Chronic renal impairment, unspecified CKD stage         DISPOSITION  ADMISSION    Discussed treatment plan and reason for admission with pt/family and admitting physician.  Pt/family voiced understanding of the plan for admission for further testing/treatment as needed.            Latest Documented Vital Signs:  As of 18:38 EDT  BP- 112/63 HR- 61 Temp- 98.1 °F (36.7 °C) O2 sat- 100%--      --------------------  Please note that portions of this were completed with a voice recognition program.       Note Disclaimer: At [facility], we believe that sharing information builds trust and better relationships. You are receiving this note because you are receiving care at [facility] or recently visited. It is possible you will see health information before a provider has talked with you about it. This kind of information can be easy to misunderstand. To help you fully understand what it means for your health, we urge you to discuss this note with your provider.             Hermann Hernandez MD  09/01/24 0077

## 2024-09-01 NOTE — PROGRESS NOTES
Clinical Pharmacy Services: Medication History    Saba Velez is a 69 y.o. female presenting to [facility] for No admission diagnoses are documented for this encounter.    She  has a past medical history of Arthritis and Hypertension.    Allergies as of 09/01/2024    (No Known Allergies)       Medication information was obtained from: Patient home med list  Pharmacy and Phone Number:     Prior to Admission Medications       Prescriptions Last Dose Informant Patient Reported? Taking?    Ascorbic Acid (VITAMIN C PO) 8/31/2024 Self Yes Yes    Take  by mouth.    aspirin 81 MG EC tablet 8/31/2024 Self Yes Yes    Take 1 tablet by mouth Daily.    Elderberry-Vitamin C-Zinc ("[employer] Gummy) 50-45-3.8 MG chewable tablet 8/31/2024 Self Yes Yes    Chew.    famotidine (PEPCID) 20 MG tablet 8/31/2024 Self Yes Yes    Take 1 tablet by mouth 3 times a day.    hydroCHLOROthiazide 25 MG tablet 8/31/2024 Self Yes Yes    Take 1 tablet by mouth Daily.    ibuprofen (ADVIL,MOTRIN) 800 MG tablet 8/31/2024 Self Yes Yes    Take 1 tablet by mouth 2 (Two) Times a Day With Meals.    metoprolol succinate XL (TOPROL-XL) 25 MG 24 hr tablet 8/31/2024 Self Yes Yes    Take 1 tablet by mouth Daily.    multivitamin with minerals (ONE-A-DAY WOMENS PO) 8/31/2024 Self Yes Yes    Take 1 tablet by mouth Daily.    sertraline (ZOLOFT) 50 MG tablet 8/31/2024 Self Yes Yes    Take 1 tablet by mouth Daily.    methocarbamol (ROBAXIN) 750 MG tablet Not Taking  No No    Take 1 tablet by mouth 3 (Three) Times a Day.    Patient not taking:  Reported on 9/1/2024              Medication notes: Patient has not taken any medications today, last dose yesterday.     This medication list is complete to the best of my knowledge as of 9/1/2024    Please call if questions.    Adama Douglas  Pharmacy Intern  Phone [phone]  9/1/2024 17:10 EDT

## 2024-09-01 NOTE — ED TRIAGE NOTES
Patient to ED via pv from home. Patient c/o nausea loss of 65lbs since January. Patient reports she has been seen for this issue but wants another opinion.

## 2024-09-02 ENCOUNTER — APPOINTMENT (OUTPATIENT)
Dept: NUCLEAR MEDICINE | Facility: HOSPITAL | Age: 69
End: 2024-09-02
Payer: MEDICARE

## 2024-09-02 LAB
ALBUMIN SERPL-MCNC: 3.2 G/DL (ref 3.5–5.2)
ANION GAP SERPL CALCULATED.3IONS-SCNC: 10 MMOL/L (ref 5–15)
ANION GAP SERPL CALCULATED.3IONS-SCNC: 6 MMOL/L (ref 5–15)
ARTERIAL PATENCY WRIST A: POSITIVE
ATMOSPHERIC PRESS: 755 MMHG
BASE EXCESS BLDA CALC-SCNC: 6.3 MMOL/L (ref 0–2)
BDY SITE: ABNORMAL
BUN SERPL-MCNC: 18 MG/DL (ref 8–23)
BUN SERPL-MCNC: 22 MG/DL (ref 8–23)
BUN/CREAT SERPL: 15.3 (ref 7–25)
BUN/CREAT SERPL: 15.6 (ref 7–25)
CALCIUM SPEC-SCNC: 8.8 MG/DL (ref 8.6–10.5)
CALCIUM SPEC-SCNC: 9 MG/DL (ref 8.6–10.5)
CHLORIDE SERPL-SCNC: 100 MMOL/L (ref 98–107)
CHLORIDE SERPL-SCNC: 101 MMOL/L (ref 98–107)
CO2 BLDA-SCNC: 31.4 MMOL/L (ref 23–27)
CO2 SERPL-SCNC: 29 MMOL/L (ref 22–29)
CO2 SERPL-SCNC: 32 MMOL/L (ref 22–29)
CREAT SERPL-MCNC: 1.18 MG/DL (ref 0.57–1)
CREAT SERPL-MCNC: 1.41 MG/DL (ref 0.57–1)
DEPRECATED RDW RBC AUTO: 43.8 FL (ref 37–54)
EGFRCR SERPLBLD CKD-EPI 2021: 40 ML/MIN/1.73
EGFRCR SERPLBLD CKD-EPI 2021: 49.5 ML/MIN/1.73
ERYTHROCYTE [DISTWIDTH] IN BLOOD BY AUTOMATED COUNT: 12.8 % (ref 12.3–15.4)
GLUCOSE SERPL-MCNC: 104 MG/DL (ref 65–99)
GLUCOSE SERPL-MCNC: 111 MG/DL (ref 65–99)
HCO3 BLDA-SCNC: 30.2 MMOL/L (ref 22–28)
HCT VFR BLD AUTO: 30.8 % (ref 34–46.6)
HEMODILUTION: NO
HGB BLD-MCNC: 10.6 G/DL (ref 12–15.9)
MAGNESIUM SERPL-MCNC: 2.4 MG/DL (ref 1.6–2.4)
MCH RBC QN AUTO: 32.3 PG (ref 26.6–33)
MCHC RBC AUTO-ENTMCNC: 34.4 G/DL (ref 31.5–35.7)
MCV RBC AUTO: 93.9 FL (ref 79–97)
MODALITY: ABNORMAL
PCO2 BLDA: 39.8 MM HG (ref 35–45)
PH BLDA: 7.49 PH UNITS (ref 7.35–7.45)
PHOSPHATE SERPL-MCNC: 2 MG/DL (ref 2.5–4.5)
PHOSPHATE SERPL-MCNC: 2.7 MG/DL (ref 2.5–4.5)
PLATELET # BLD AUTO: 277 10*3/MM3 (ref 140–450)
PMV BLD AUTO: 9.1 FL (ref 6–12)
PO2 BLDA: 62.2 MM HG (ref 80–100)
POTASSIUM SERPL-SCNC: 2.6 MMOL/L (ref 3.5–5.2)
POTASSIUM SERPL-SCNC: 2.7 MMOL/L (ref 3.5–5.2)
POTASSIUM SERPL-SCNC: 3.3 MMOL/L (ref 3.5–5.2)
RBC # BLD AUTO: 3.28 10*6/MM3 (ref 3.77–5.28)
SAO2 % BLDCOA: 93.1 % (ref 92–98.5)
SODIUM SERPL-SCNC: 138 MMOL/L (ref 136–145)
SODIUM SERPL-SCNC: 140 MMOL/L (ref 136–145)
TOTAL RATE: 18 BREATHS/MINUTE
WBC NRBC COR # BLD AUTO: 8.4 10*3/MM3 (ref 3.4–10.8)

## 2024-09-02 PROCEDURE — 84100 ASSAY OF PHOSPHORUS: CPT | Performed by: HOSPITALIST

## 2024-09-02 PROCEDURE — 85027 COMPLETE CBC AUTOMATED: CPT | Performed by: HOSPITALIST

## 2024-09-02 PROCEDURE — 97530 THERAPEUTIC ACTIVITIES: CPT

## 2024-09-02 PROCEDURE — 80048 BASIC METABOLIC PNL TOTAL CA: CPT | Performed by: HOSPITALIST

## 2024-09-02 PROCEDURE — 25010000002 POTASSIUM CHLORIDE PER 2 MEQ OF POTASSIUM: Performed by: HOSPITALIST

## 2024-09-02 PROCEDURE — 25810000003 LACTATED RINGERS PER 1000 ML: Performed by: HOSPITALIST

## 2024-09-02 PROCEDURE — 97162 PT EVAL MOD COMPLEX 30 MIN: CPT

## 2024-09-02 PROCEDURE — 84132 ASSAY OF SERUM POTASSIUM: CPT | Performed by: HOSPITALIST

## 2024-09-02 PROCEDURE — 80069 RENAL FUNCTION PANEL: CPT | Performed by: HOSPITALIST

## 2024-09-02 PROCEDURE — 83735 ASSAY OF MAGNESIUM: CPT | Performed by: HOSPITALIST

## 2024-09-02 PROCEDURE — 25010000002 HEPARIN (PORCINE) PER 1000 UNITS: Performed by: HOSPITALIST

## 2024-09-02 PROCEDURE — 36600 WITHDRAWAL OF ARTERIAL BLOOD: CPT

## 2024-09-02 PROCEDURE — 82803 BLOOD GASES ANY COMBINATION: CPT

## 2024-09-02 PROCEDURE — A9537 TC99M MEBROFENIN: HCPCS | Performed by: HOSPITALIST

## 2024-09-02 PROCEDURE — 78227 HEPATOBIL SYST IMAGE W/DRUG: CPT

## 2024-09-02 PROCEDURE — 25010000002 SINCALIDE PER 5 MCG: Performed by: HOSPITALIST

## 2024-09-02 PROCEDURE — 25010000002 POTASSIUM CHLORIDE 10 MEQ/100ML SOLUTION: Performed by: HOSPITALIST

## 2024-09-02 PROCEDURE — 0 TECHNETIUM TC 99M MEBROFENIN KIT: Performed by: HOSPITALIST

## 2024-09-02 RX ORDER — POTASSIUM CHLORIDE 750 MG/1
40 TABLET, FILM COATED, EXTENDED RELEASE ORAL
Status: COMPLETED | OUTPATIENT
Start: 2024-09-02 | End: 2024-09-02

## 2024-09-02 RX ORDER — POTASSIUM CHLORIDE 7.45 MG/ML
10 INJECTION INTRAVENOUS
Status: DISPENSED | OUTPATIENT
Start: 2024-09-02 | End: 2024-09-02

## 2024-09-02 RX ORDER — POTASSIUM CHLORIDE 750 MG/1
40 TABLET, FILM COATED, EXTENDED RELEASE ORAL EVERY 4 HOURS
Status: COMPLETED | OUTPATIENT
Start: 2024-09-02 | End: 2024-09-03

## 2024-09-02 RX ORDER — KIT FOR THE PREPARATION OF TECHNETIUM TC 99M MEBROFENIN 45 MG/10ML
1 INJECTION, POWDER, LYOPHILIZED, FOR SOLUTION INTRAVENOUS
Status: COMPLETED | OUTPATIENT
Start: 2024-09-02 | End: 2024-09-02

## 2024-09-02 RX ADMIN — POTASSIUM CHLORIDE 10 MEQ: 7.46 INJECTION, SOLUTION INTRAVENOUS at 08:27

## 2024-09-02 RX ADMIN — POTASSIUM CHLORIDE: 2 INJECTION, SOLUTION, CONCENTRATE INTRAVENOUS at 20:03

## 2024-09-02 RX ADMIN — POTASSIUM CHLORIDE 10 MEQ: 7.46 INJECTION, SOLUTION INTRAVENOUS at 09:36

## 2024-09-02 RX ADMIN — ASPIRIN 81 MG: 81 TABLET, COATED ORAL at 15:38

## 2024-09-02 RX ADMIN — POTASSIUM CHLORIDE 40 MEQ: 750 TABLET, EXTENDED RELEASE ORAL at 23:35

## 2024-09-02 RX ADMIN — HEPARIN SODIUM 5000 UNITS: 5000 INJECTION INTRAVENOUS; SUBCUTANEOUS at 21:23

## 2024-09-02 RX ADMIN — POTASSIUM CHLORIDE 10 MEQ: 7.46 INJECTION, SOLUTION INTRAVENOUS at 10:21

## 2024-09-02 RX ADMIN — FAMOTIDINE 20 MG: 20 TABLET, FILM COATED ORAL at 17:09

## 2024-09-02 RX ADMIN — METOPROLOL SUCCINATE 25 MG: 25 TABLET, EXTENDED RELEASE ORAL at 15:38

## 2024-09-02 RX ADMIN — POTASSIUM CHLORIDE 10 MEQ: 7.46 INJECTION, SOLUTION INTRAVENOUS at 06:03

## 2024-09-02 RX ADMIN — POTASSIUM CHLORIDE 40 MEQ: 750 TABLET, EXTENDED RELEASE ORAL at 15:38

## 2024-09-02 RX ADMIN — POTASSIUM CHLORIDE 40 MEQ: 750 TABLET, EXTENDED RELEASE ORAL at 14:46

## 2024-09-02 RX ADMIN — Medication 10 ML: at 15:40

## 2024-09-02 RX ADMIN — SERTRALINE 50 MG: 50 TABLET, FILM COATED ORAL at 15:38

## 2024-09-02 RX ADMIN — SINCALIDE 2.2 MCG: 5 INJECTION, POWDER, LYOPHILIZED, FOR SOLUTION INTRAVENOUS at 12:29

## 2024-09-02 RX ADMIN — MEBROFENIN 1 DOSE: 45 INJECTION, POWDER, LYOPHILIZED, FOR SOLUTION INTRAVENOUS at 11:25

## 2024-09-02 RX ADMIN — POTASSIUM CHLORIDE 10 MEQ: 7.46 INJECTION, SOLUTION INTRAVENOUS at 06:04

## 2024-09-02 RX ADMIN — POTASSIUM, SODIUM PHOSPHATES 280 MG-160 MG-250 MG ORAL POWDER PACKET 2 PACKET: POWDER IN PACKET at 18:30

## 2024-09-02 NOTE — PROGRESS NOTES
Name: Saba Velez ADMIT: 2024   : 1955  PCP: Oscar Lawrence MD    MRN: [id number] LOS: 0 days   AGE/SEX: 69 y.o. female  ROOM: [facility]     Subjective   Subjective   She reports feeling better today.  No specific complaints.       Objective   Objective   Vital Signs  Temp:  [98.1 °F (36.7 °C)-99.1 °F (37.3 °C)] 99.1 °F (37.3 °C)  Heart Rate:  [57-97] 61  Resp:  [16-18] 18  BP: ()/(51-84) 90/57  SpO2:  [98 %-100 %] 98 %  on   ;   Device (Oxygen Therapy): room air  Body mass index is 41.02 kg/m².  Physical Exam  Vitals and nursing note reviewed.   Constitutional:       General: She is not in acute distress.     Appearance: She is obese.   Cardiovascular:      Rate and Rhythm: Normal rate and regular rhythm.   Pulmonary:      Effort: Pulmonary effort is normal.      Breath sounds: Normal breath sounds.   Abdominal:      General: Bowel sounds are normal.      Palpations: Abdomen is soft.      Tenderness: There is no abdominal tenderness.   Musculoskeletal:         General: No swelling.   Skin:     General: Skin is warm and dry.   Neurological:      Mental Status: She is alert. Mental status is at baseline.       Results Review     I reviewed the patient's new clinical results.  Results from last 7 days   Lab Units 24  0304 24  1524   WBC 10*3/mm3 8.40 7.94   HEMOGLOBIN g/dL 10.6* 12.0   PLATELETS 10*3/mm3 277 306     Results from last 7 days   Lab Units 24  0304 24  1524   SODIUM mmol/L 138 139   POTASSIUM mmol/L 2.6* 2.2*   CHLORIDE mmol/L 100 94*   CO2 mmol/L 32.0* 32.7*   BUN mg/dL 22 25*   CREATININE mg/dL 1.41* 1.82*   GLUCOSE mg/dL 104* 110*   EGFR mL/min/1.73 40.0* 29.4*     Results from last 7 days   Lab Units 24  1524   ALBUMIN g/dL 3.7   BILIRUBIN mg/dL 0.8   ALK PHOS U/L 66   AST (SGOT) U/L 35*   ALT (SGPT) U/L 25     Results from last 7 days   Lab Units 24  0304 24  1524   CALCIUM mg/dL 8.8 9.5   ALBUMIN g/dL  --  3.7   MAGNESIUM mg/dL 2.4  "2.4   PHOSPHORUS mg/dL 2.7  --      Results from last 7 days   Lab Units 09/01/24  1524   LACTATE mmol/L 1.6     No results found for: \"HGBA1C\", \"POCGLU\"    No radiology results for the last day    I have personally reviewed all medications:  Scheduled Medications  aspirin, 81 mg, Oral, Daily  famotidine, 20 mg, Oral, BID AC  heparin (porcine), 5,000 Units, Subcutaneous, Q12H  metoprolol succinate XL, 25 mg, Oral, Daily  potassium chloride, 40 mEq, Oral, Q2H  sertraline, 50 mg, Oral, Daily  sodium chloride, 10 mL, Intravenous, Q12H    Infusions  lactated ringers 980 mL with potassium chloride 40 mEq infusion,     Diet  NPO Diet NPO Type: Strict NPO    I have personally reviewed:  [x]  Laboratory   [x]  Microbiology   [x]  Radiology   [x]  EKG/Telemetry  [x]  Cardiology/Vascular   []  Pathology    []  Records       Assessment/Plan     Active Hospital Problems    Diagnosis  POA    **Hypokalemia [E87.6]  Yes    Asymptomatic bacteriuria [R82.71]  Yes    Acute kidney injury [N17.9]  Yes    Obstructive sleep apnea [G47.33]  Yes    Weight loss [R63.4]  Yes    Early satiety [R68.81]  Yes      Resolved Hospital Problems   No resolved problems to display.       69 y.o. female admitted with Hypokalemia.    Discussed with nephrology.  Creatinine continues to trend better on IV fluids.  Gallbladder ultrasound and HIDA scan both pending.  Reviewed noncontrasted CT scan from 8/30.  No explanation for weight loss on that study though noncontrasted.  Per my discussion with renal will continue fluids today and consider contrasted CT tomorrow if renal function improving.  May benefit from bidirectional endoscopy as an outpatient.  Weight loss has been going on since January.     Placing potassium  Follow-up gallbladder ultrasound and HIDA      Heparin SC for DVT prophylaxis.  Full code.  Discussed with patient and consulting provider.  Anticipate discharge home in 1-2 days.  Expected Discharge Date: 9/4/2024; Expected Discharge Time: "       Sudarshan Posey MD  [facility]  09/02/24  12:31 EDT

## 2024-09-02 NOTE — SIGNIFICANT NOTE
09/02/24 1537   OTHER   Discipline occupational therapist   Rehab Time/Intention   Session Not Performed other (see comments)  (Pt ambulated SBA with rwx 100' and reports no issues with ADLs or functional mobility. OT to sign off. Please reconsult if status changes.)

## 2024-09-02 NOTE — PLAN OF CARE
Goal Outcome Evaluation:  Plan of Care Reviewed With: patient           Outcome Evaluation: 70 y/o female pt reports to ED on 9/1 w/ c/o nausea as well as generalized fatigue/weakness with 65lb weight loss over the last 6-7 months. Found hypokalemia and JACQUI, workup still ongoing. Pt reports at baseline she lives in single story home with her adult son, is IND with all ADLs and mobility, denies any mobility and safety concerns at her house. Today at evaluation, she performs all bed mobility with SBA, stands, and amb 100' with rwx/SBA, declines further mobility. She has no overt LOB throughout, does utilize rwx so encouraged pt to use AD at home should she feel necessary at d/c. She appears to be at her baseline status with overall generalized weakness however appears safe to return to PLOF at d/c. At this time, she feels she is safe for return home, no acute PT needs, will s/o. Please re-consult if status changes. Encouraged increased ambulation throughout the day with nursing staff.      Anticipated Discharge Disposition (PT): home with assist

## 2024-09-02 NOTE — CASE MANAGEMENT/SOCIAL WORK
Continued Stay Note  [facility]     Patient Name: Saba Velez  MRN: [id number]  Today's Date: 9/2/2024    Admit Date: 9/1/2024        Discharge Plan       Row Name 09/02/24 1409       Plan    Plan Comments Attempted to screen, patient not in room. OSMAN left at bedside.                   Discharge Codes    No documentation.                 Expected Discharge Date and Time       Expected Discharge Date Expected Discharge Time    Sep 4, 2024               Leona Shields RN

## 2024-09-02 NOTE — PLAN OF CARE
Problem: Adult Inpatient Plan of Care  Goal: Plan of Care Review  9/2/2024 0036 by Rmaón Abdullahi RN  Outcome: Ongoing, Progressing  Flowsheets (Taken 9/2/2024 0036)  Plan of Care Reviewed With: patient  9/2/2024 0035 by Ramón Abdullahi RN  Outcome: Ongoing, Progressing  Flowsheets (Taken 9/2/2024 0035)  Plan of Care Reviewed With: patient     Problem: Electrolyte Imbalance  Goal: Electrolyte Balance  Outcome: Ongoing, Progressing     Problem: Dysrhythmia  Goal: Normalized Cardiac Rhythm  Outcome: Ongoing, Progressing     Problem: UTI (Urinary Tract Infection)  Goal: Improved Infection Symptoms  Outcome: Ongoing, Progressing     Problem: Obstructive Sleep Apnea Risk or Actual Comorbidity Management  Goal: Unobstructed Breathing During Sleep  9/2/2024 0036 by Ramón Abdullahi RN  Outcome: Ongoing, Progressing  9/2/2024 0035 by Ramón Abdullahi RN  Outcome: Ongoing, Progressing   Goal Outcome Evaluation:  Plan of Care Reviewed With: patient      Patient educated on treatment and goals of care, including, but not limited to, fall prevention and pressure injury prevention. Discussed details of hospitalization and plan. All questions answered.

## 2024-09-02 NOTE — THERAPY EVALUATION
Patient Name: Saba Velez  : 1955    MRN: [id number]                              Today's Date: 2024       Admit Date: 2024    Visit Dx:     ICD-10-CM ICD-9-CM   1. Intractable nausea  R11.0 787.02   2. Hypokalemia  E87.6 276.8   3. Acute UTI  N39.0 599.0   4. Weight loss  R63.4 783.21   5. Weakness  R53.1 780.79   6. Chronic renal impairment, unspecified CKD stage  N18.9 585.9     Patient Active Problem List   Diagnosis    Hypokalemia    Asymptomatic bacteriuria    Acute kidney injury    Obstructive sleep apnea    Arthritis    Hypertension    Obesity    Weight loss    Early satiety     Past Medical History:   Diagnosis Date    Arthritis     Hypertension      Past Surgical History:   Procedure Laterality Date    ABDOMINAL SURGERY      JOINT REPLACEMENT      R hip       General Information       Row Name 24 1326          Physical Therapy Time and Intention    Document Type evaluation  -MO     Mode of Treatment individual therapy;physical therapy  -MO       Row Name 24 1326          General Information    Patient Profile Reviewed yes  -MO     Prior Level of Function independent:;ADL's;gait  -MO     Existing Precautions/Restrictions no known precautions/restrictions  -MO       Row Name 24 1326          Living Environment    People in Home child(candis), adult  -MO       Row Name 24 1326          Home Main Entrance    Number of Stairs, Main Entrance none  -MO       Row Name 24 1326          Stairs Within Home, Primary    Stairs, Within Home, Primary Single story home  -MO     Number of Stairs, Within Home, Primary none  -MO       Row Name 24 1326          Cognition    Orientation Status (Cognition) oriented x 4  -MO       Row Name 24 1326          Safety Issues, Functional Mobility    Impairments Affecting Function (Mobility) strength;endurance/activity tolerance  -MO               User Key  (r) = Recorded By, (t) = Taken By, (c) = Cosigned By       Initials Name Provider Type    Cari Anguiano, PT Physical Therapist                   Mobility       Row Name 09/02/24 1327          Bed Mobility    Bed Mobility supine-sit;sit-supine  -MO     Supine-Sit Gray (Bed Mobility) standby assist  -MO     Sit-Supine Gray (Bed Mobility) standby assist  -MO     Assistive Device (Bed Mobility) bed rails  -MO       Row Name 09/02/24 1327          Sit-Stand Transfer    Sit-Stand Gray (Transfers) standby assist  -MO     Assistive Device (Sit-Stand Transfers) walker, front-wheeled  -MO       Row Name 09/02/24 1327          Gait/Stairs (Locomotion)    Gray Level (Gait) standby assist  -MO     Assistive Device (Gait) walker, front-wheeled  -MO     Patient was able to Ambulate yes  -MO     Distance in Feet (Gait) 100  -MO     Deviations/Abnormal Patterns (Gait) gait speed decreased  -MO     Comment, (Gait/Stairs) No overt LOB noted  -MO               User Key  (r) = Recorded By, (t) = Taken By, (c) = Cosigned By      Initials Name Provider Type    Cari Anguiano PT Physical Therapist                   Obj/Interventions       Row Name 09/02/24 1328          Range of Motion Comprehensive    General Range of Motion no range of motion deficits identified  -MO       Row Name 09/02/24 1328          Strength Comprehensive (MMT)    Comment, General Manual Muscle Testing (MMT) Assessment Generalized deconditioning, no apparent acute focal strength deficits  -MO       Row Name 09/02/24 1328          Balance    Balance Assessment sitting static balance;sitting dynamic balance;standing static balance;standing dynamic balance  -MO     Static Sitting Balance supervision  -MO     Dynamic Sitting Balance supervision  -MO     Position, Sitting Balance sitting edge of bed  -MO     Static Standing Balance standby assist  -MO     Dynamic Standing Balance standby assist  -MO     Position/Device Used, Standing Balance walker, front-wheeled  -MO               User Key   (r) = Recorded By, (t) = Taken By, (c) = Cosigned By      Initials Name Provider Type    Cari Anguiano, PT Physical Therapist                   Goals/Plan    No documentation.                  Clinical Impression       Row Name 09/02/24 1329          Pain    Pretreatment Pain Rating 0/10 - no pain  -MO     Posttreatment Pain Rating 0/10 - no pain  -MO       Row Name 09/02/24 1329          Plan of Care Review    Plan of Care Reviewed With patient  -MO     Outcome Evaluation 70 y/o female pt reports to ED on 9/1 w/ c/o nausea as well as generalized fatigue/weakness with 65lb weight loss over the last 6-7 months. Found hypokalemia and JACQUI, workup still ongoing. Pt reports at baseline she lives in single story home with her adult son, is IND with all ADLs and mobility, denies any mobility and safety concerns at her house. Today at evaluation, she performs all bed mobility with SBA, stands, and amb 100' with rwx/SBA, declines further mobility. She has no overt LOB throughout, does utilize rwx so encouraged pt to use AD at home should she feel necessary at d/c. She appears to be at her baseline status with overall generalized weakness however appears safe to return to PLOF at d/c. At this time, she feels she is safe for return home, no acute PT needs, will s/o. Please re-consult if status changes. Encouraged increased ambulation throughout the day with nursing staff.  -MO       Row Name 09/02/24 1329          Therapy Assessment/Plan (PT)    Criteria for Skilled Interventions Met (PT) no problems identified which require skilled intervention  -MO     Therapy Frequency (PT) evaluation only  -MO       Row Name 09/02/24 1329          Vital Signs    O2 Delivery Pre Treatment room air  -MO     O2 Delivery Intra Treatment room air  -MO     O2 Delivery Post Treatment room air  -MO       Row Name 09/02/24 1329          Positioning and Restraints    Pre-Treatment Position in bed  -MO     Post Treatment Position bed  -MO     In  Bed supine;call light within reach;encouraged to call for assist;exit alarm on  -MO               User Key  (r) = Recorded By, (t) = Taken By, (c) = Cosigned By      Initials Name Provider Type    Cari Anguiano, LILLIAM Physical Therapist                   Outcome Measures       Row Name 09/02/24 1333 09/02/24 0800       How much help from another person do you currently need...    Turning from your back to your side while in flat bed without using bedrails? 4  -MO 4  -MB    Moving from lying on back to sitting on the side of a flat bed without bedrails? 4  -MO 4  -MB    Moving to and from a bed to a chair (including a wheelchair)? 3  -MO 3  -MB    Standing up from a chair using your arms (e.g., wheelchair, bedside chair)? 3  -MO 3  -MB    Climbing 3-5 steps with a railing? 3  -MO 2  -MB    To walk in hospital room? 3  -MO 3  -MB    AM-PAC 6 Clicks Score (PT) 20  -MO 19  -MB    Highest Level of Mobility Goal 6 --> Walk 10 steps or more  -MO 6 --> Walk 10 steps or more  -MB      Row Name 09/02/24 1333          Functional Assessment    Outcome Measure Options AM-PAC 6 Clicks Basic Mobility (PT)  -MO               User Key  (r) = Recorded By, (t) = Taken By, (c) = Cosigned By      Initials Name Provider Type    Cari Anguiano PT Physical Therapist    Mildred Rojas LPN Licensed Nurse                                 Physical Therapy Education       Title: PT OT SLP Therapies (Done)       Topic: Physical Therapy (Done)       Point: Mobility training (Done)       Learning Progress Summary             Patient Acceptance, E, VU by MO at 9/2/2024 1334                         Point: Home exercise program (Done)       Learning Progress Summary             Patient Acceptance, E, VU by MO at 9/2/2024 1334                         Point: Body mechanics (Done)       Learning Progress Summary             Patient Acceptance, E, VU by MO at 9/2/2024 1334                         Point: Precautions (Done)       Learning Progress  Summary             Patient Acceptance, E, VU by MO at 9/2/2024 1334                                         User Key       Initials Effective Dates Name Provider Type Discipline    MO 05/26/23 -  Cari Butler, PT Physical Therapist PT                  PT Recommendation and Plan     Plan of Care Reviewed With: patient  Outcome Evaluation: 68 y/o female pt reports to ED on 9/1 w/ c/o nausea as well as generalized fatigue/weakness with 65lb weight loss over the last 6-7 months. Found hypokalemia and JACQUI, workup still ongoing. Pt reports at baseline she lives in single story home with her adult son, is IND with all ADLs and mobility, denies any mobility and safety concerns at her house. Today at evaluation, she performs all bed mobility with SBA, stands, and amb 100' with rwx/SBA, declines further mobility. She has no overt LOB throughout, does utilize rwx so encouraged pt to use AD at home should she feel necessary at d/c. She appears to be at her baseline status with overall generalized weakness however appears safe to return to PLOF at d/c. At this time, she feels she is safe for return home, no acute PT needs, will s/o. Please re-consult if status changes. Encouraged increased ambulation throughout the day with nursing staff.     Time Calculation:         PT Charges       Row Name 09/02/24 8224             Time Calculation    Start Time 1041  -MO      Stop Time 1054  -MO      Time Calculation (min) 13 min  -MO      PT Non-Billable Time (min) 5 min  -MO      PT Received On 09/02/24  -MO         Time Calculation- PT    Total Timed Code Minutes- PT 8 minute(s)  -MO         Timed Charges    75713 - PT Therapeutic Activity Minutes 8  -MO         Untimed Charges    PT Eval/Re-eval Minutes 5  -MO         Total Minutes    Timed Charges Total Minutes 8  -MO      Untimed Charges Total Minutes 5  -MO       Total Minutes 13  -MO                User Key  (r) = Recorded By, (t) = Taken By, (c) = Cosigned By      Initials Name  Provider Type    MO Cari Butler, PT Physical Therapist                  Therapy Charges for Today       Code Description Service Date Service Provider Modifiers Qty    64189546899 HC PT THERAPEUTIC ACT EA 15 MIN 9/2/2024 Cari Butler, PT GP 1    23815764198 HC PT EVAL MOD COMPLEXITY 2 9/2/2024 Cari uBtler, PT GP 1            PT G-Codes  Outcome Measure Options: AM-PAC 6 Clicks Basic Mobility (PT)  AM-PAC 6 Clicks Score (PT): 20  PT Discharge Summary  Anticipated Discharge Disposition (PT): home with assist    Cari Butler PT  9/2/2024

## 2024-09-02 NOTE — CONSULTS
Visit with patient to complete Advance Directive. Patient named her son Rudolph and her niece, Renea, to make decisions for her. Patient alert and oriented and able to confidently answer questions. Notarized and faxed to [facility]

## 2024-09-02 NOTE — PROGRESS NOTES
[facility] Consult Note      Patient Name: Saba Velez  : 1955  MRN: [id number]  Primary Care Physician:  Oscar Lawrence MD  Referring Physician: No ref. provider found  Date of admission: 2024    Subjective     Reason for Consult:  Hypokalemia     HPI:   Saba Velez is a 69 y.o. female known to have a history of hypertension who presented to the hospital with nausea and vomiting in addition to weight loss of more than 60 pounds since 2024.  Noted decreased oral intake and early satiety in addition to recurrent episode of nausea and vomiting with no abdominal pain.  -In ER she was found to to have soft blood pressure and laboratory investigation showed severe hypokalemia with potassium of 2.2, sodium 139, creatinine of 1.8.  Her baseline creatinine is unknown but creatinine was 2.7 on 2024.  She is not aware of any history of chronic kidney disease. Apparently she takes ibuprofen daily and she was on hydrochlorothiazide.  -Patient received 120 mEq of potassium orally 30 mg IV so far.  With potassium up to 2.6 this morning.      Review of Systems:   14 point review of systems is otherwise negative except for mentioned above on HPI    Personal History     Past Medical History:   Diagnosis Date    Arthritis     Hypertension        Past Surgical History:   Procedure Laterality Date    ABDOMINAL SURGERY      JOINT REPLACEMENT      R hip        Family History: family history is not on file.    Social History:  reports that she has never smoked. She has never been exposed to tobacco smoke. She has never used smokeless tobacco. She reports that she does not drink alcohol and does not use drugs.    Home Medications:  Prior to Admission medications    Medication Sig Start Date End Date Taking? Authorizing Provider   Ascorbic Acid (VITAMIN C PO) Take  by mouth.   Yes Provider, MD Cherelle   aspirin 81 MG EC tablet Take 1 tablet by mouth Daily.   Yes  ProviderCherelle MD Elderberry-Vitamin C-Zinc (Bizen Gummy) 50-45-3.8 MG chewable tablet Chew.   Yes Cherelle Laurent MD   famotidine (PEPCID) 20 MG tablet Take 1 tablet by mouth 3 times a day.   Yes Cherelle Laurent MD   hydroCHLOROthiazide 25 MG tablet Take 1 tablet by mouth Daily. 3/2/15  Yes Cherelle Laurent MD   ibuprofen (ADVIL,MOTRIN) 800 MG tablet Take 1 tablet by mouth 2 (Two) Times a Day With Meals.   Yes Cherelle Laurent MD   metoprolol succinate XL (TOPROL-XL) 25 MG 24 hr tablet Take 1 tablet by mouth Daily. 4/16/24  Yes Cherelle Laurent MD   multivitamin with minerals (ONE-A-DAY WOMENS PO) Take 1 tablet by mouth Daily.   Yes Cherelle Laurent MD   sertraline (ZOLOFT) 50 MG tablet Take 1 tablet by mouth Daily. 4/16/24  Yes Cherelle Laurent MD   methocarbamol (ROBAXIN) 750 MG tablet Take 1 tablet by mouth 3 (Three) Times a Day.  Patient not taking: Reported on 9/1/2024 10/29/19   Papito Gay III, PA       Allergies:  No Known Allergies    Objective     Vitals:   Temp:  [98.1 °F (36.7 °C)-98.8 °F (37.1 °C)] 98.8 °F (37.1 °C)  Heart Rate:  [57-97] 67  Resp:  [16-18] 18  BP: ()/(51-84) 101/65    Intake/Output Summary (Last 24 hours) at 9/2/2024 0840  Last data filed at 9/2/2024 0300  Gross per 24 hour   Intake 1200 ml   Output 475 ml   Net 725 ml       Physical Exam:   Constitutional: Awake, alert, no acute distress.  Overweight.  HEENT: Sclera anicteric, no conjunctival injection  Neck: Supple, no thyromegaly, no lymphadenopathy, trachea at midline, no JVD  Respiratory: Clear to auscultation bilaterally, nonlabored respiration  Cardiovascular: RRR, no murmurs, no rubs or gallops, no carotid bruit  Gastrointestinal: Positive bowel sounds, abdomen is soft, nontender and nondistended  : No palpable bladder  Musculoskeletal: No edema, no clubbing or cyanosis  Psychiatric: Appropriate affect, cooperative  Neurologic: Oriented x3,  moving all extremities, normal speech and mental status  Skin: Warm and dry       Scheduled Meds:     aspirin, 81 mg, Oral, Daily  famotidine, 20 mg, Oral, BID AC  heparin (porcine), 5,000 Units, Subcutaneous, Q12H  metoprolol succinate XL, 25 mg, Oral, Daily  potassium chloride, 10 mEq, Intravenous, Q1H  sertraline, 50 mg, Oral, Daily  sodium chloride, 10 mL, Intravenous, Q12H      IV Meds:        Results Reviewed:   I have personally reviewed the results from the time of this admission to 9/2/2024 08:40 EDT     Lab Results   Component Value Date    GLUCOSE 104 (H) 09/02/2024    CALCIUM 8.8 09/02/2024     09/02/2024    K 2.6 (C) 09/02/2024    CO2 32.0 (H) 09/02/2024     09/02/2024    BUN 22 09/02/2024    CREATININE 1.41 (H) 09/02/2024    BCR 15.6 09/02/2024    ANIONGAP 6.0 09/02/2024      Lab Results   Component Value Date    MG 2.4 09/02/2024    PHOS 2.7 09/02/2024    ALBUMIN 3.7 09/01/2024           Assessment / Plan     ASSESSMENT:  Acute kidney injury.  Creatinine baseline is unknown.  Creatinine 2.8 on 8/29/2024.  Etiology is likely secondary to prerenal/ATN in the setting of decreased oral intake and hypotension compounded by hydrochlorothiazide and NSAID use.  Creatinine trending down with IV hydration.  Urinalysis with too numerous to count WBC in favor of UTI associated with some RBC.  Urine sodium at 38 not in favor prerenal state but this was taken on hydrochlorothiazide.  Patient is unaware of  history of chronic kidney disease  Hypokalemia: Severe likely secondary to decreased oral intake in the setting of potassium loss due to hydrochlorothiazide.  Elevated bicarbonate likely metabolic alkalosis secondary to hypovolemia  Anemia  History of arthritis  Excessive unintentional weight loss of more than 60 pound unintentional  Possible UTI            PLAN:  Kidney function has been improving with IV hydration.  Will switch IV fluid to LR at 125 cc an hour  Replace potassium aggressively.  Will  check ABGs because of low bicarbonate  Will check SPEP and immunofixation given anemia, acute kidney injury and weight loss to rule out multiple myeloma  Hold on renal ultrasound at this time given improvement in kidney function.  Will need a CT scan of the abdomen and pelvis with IV contrast to rule out malignancy.  Recommend no added contrast agent today awaiting for improvement of kidney function  Labs every 12 hours  Urine potassium   Defer antibiotic management to primary  Discussed with Dr. Posey      Thank you for involving us in the care of Saba Velez.  Please feel free to call with any questions.    Lulú Sutherland MD  09/02/24  08:40 EDT    [facility]  [phone]    Parts of this note may be an electronic transcription/translation of spoken language to printed text using the Dragon dictation system.

## 2024-09-03 VITALS
HEIGHT: 64 IN | TEMPERATURE: 98.2 F | DIASTOLIC BLOOD PRESSURE: 68 MMHG | WEIGHT: 242.29 LBS | HEART RATE: 78 BPM | BODY MASS INDEX: 41.36 KG/M2 | SYSTOLIC BLOOD PRESSURE: 106 MMHG | RESPIRATION RATE: 16 BRPM | OXYGEN SATURATION: 93 %

## 2024-09-03 PROBLEM — E43 SEVERE MALNUTRITION: Status: ACTIVE | Noted: 2024-09-03

## 2024-09-03 LAB
ALBUMIN SERPL-MCNC: 3.1 G/DL (ref 3.5–5.2)
ALBUMIN SERPL-MCNC: 3.1 G/DL (ref 3.5–5.2)
ALBUMIN SERPL-MCNC: 3.2 G/DL (ref 3.5–5.2)
ANION GAP SERPL CALCULATED.3IONS-SCNC: 10 MMOL/L (ref 5–15)
ANION GAP SERPL CALCULATED.3IONS-SCNC: 7.3 MMOL/L (ref 5–15)
ANION GAP SERPL CALCULATED.3IONS-SCNC: 9 MMOL/L (ref 5–15)
BACTERIA SPEC AEROBE CULT: ABNORMAL
BUN SERPL-MCNC: 13 MG/DL (ref 8–23)
BUN SERPL-MCNC: 15 MG/DL (ref 8–23)
BUN SERPL-MCNC: 17 MG/DL (ref 8–23)
BUN/CREAT SERPL: 11.4 (ref 7–25)
BUN/CREAT SERPL: 14.5 (ref 7–25)
BUN/CREAT SERPL: 14.9 (ref 7–25)
CALCIUM SPEC-SCNC: 8.7 MG/DL (ref 8.6–10.5)
CALCIUM SPEC-SCNC: 8.8 MG/DL (ref 8.6–10.5)
CALCIUM SPEC-SCNC: 8.9 MG/DL (ref 8.6–10.5)
CHLORIDE SERPL-SCNC: 104 MMOL/L (ref 98–107)
CHLORIDE SERPL-SCNC: 107 MMOL/L (ref 98–107)
CHLORIDE SERPL-SCNC: 108 MMOL/L (ref 98–107)
CO2 SERPL-SCNC: 26 MMOL/L (ref 22–29)
CO2 SERPL-SCNC: 27 MMOL/L (ref 22–29)
CO2 SERPL-SCNC: 28.7 MMOL/L (ref 22–29)
CORTIS SERPL-MCNC: 13.1 MCG/DL
CREAT SERPL-MCNC: 1.01 MG/DL (ref 0.57–1)
CREAT SERPL-MCNC: 1.14 MG/DL (ref 0.57–1)
CREAT SERPL-MCNC: 1.17 MG/DL (ref 0.57–1)
EGFRCR SERPLBLD CKD-EPI 2021: 50 ML/MIN/1.73
EGFRCR SERPLBLD CKD-EPI 2021: 51.6 ML/MIN/1.73
EGFRCR SERPLBLD CKD-EPI 2021: 59.6 ML/MIN/1.73
GLUCOSE SERPL-MCNC: 107 MG/DL (ref 65–99)
GLUCOSE SERPL-MCNC: 126 MG/DL (ref 65–99)
GLUCOSE SERPL-MCNC: 96 MG/DL (ref 65–99)
MAGNESIUM SERPL-MCNC: 2.2 MG/DL (ref 1.6–2.4)
PHOSPHATE SERPL-MCNC: 2.1 MG/DL (ref 2.5–4.5)
PHOSPHATE SERPL-MCNC: 2.4 MG/DL (ref 2.5–4.5)
PHOSPHATE SERPL-MCNC: 2.5 MG/DL (ref 2.5–4.5)
PHOSPHATE SERPL-MCNC: 2.9 MG/DL (ref 2.5–4.5)
POTASSIUM SERPL-SCNC: 3.4 MMOL/L (ref 3.5–5.2)
POTASSIUM SERPL-SCNC: 3.9 MMOL/L (ref 3.5–5.2)
POTASSIUM SERPL-SCNC: 3.9 MMOL/L (ref 3.5–5.2)
POTASSIUM SERPL-SCNC: 4.3 MMOL/L (ref 3.5–5.2)
SODIUM SERPL-SCNC: 140 MMOL/L (ref 136–145)
SODIUM SERPL-SCNC: 143 MMOL/L (ref 136–145)
SODIUM SERPL-SCNC: 144 MMOL/L (ref 136–145)

## 2024-09-03 PROCEDURE — 80069 RENAL FUNCTION PANEL: CPT | Performed by: HOSPITALIST

## 2024-09-03 PROCEDURE — 25010000002 POTASSIUM CHLORIDE PER 2 MEQ OF POTASSIUM: Performed by: HOSPITALIST

## 2024-09-03 PROCEDURE — 25810000003 LACTATED RINGERS PER 1000 ML: Performed by: HOSPITALIST

## 2024-09-03 PROCEDURE — 82533 TOTAL CORTISOL: CPT | Performed by: INTERNAL MEDICINE

## 2024-09-03 PROCEDURE — 80069 RENAL FUNCTION PANEL: CPT | Performed by: INTERNAL MEDICINE

## 2024-09-03 PROCEDURE — 83735 ASSAY OF MAGNESIUM: CPT | Performed by: INTERNAL MEDICINE

## 2024-09-03 PROCEDURE — 84132 ASSAY OF SERUM POTASSIUM: CPT | Performed by: HOSPITALIST

## 2024-09-03 PROCEDURE — 25810000003 SODIUM CHLORIDE 0.9 % SOLUTION: Performed by: INTERNAL MEDICINE

## 2024-09-03 PROCEDURE — 25010000002 HEPARIN (PORCINE) PER 1000 UNITS: Performed by: HOSPITALIST

## 2024-09-03 RX ORDER — FENTANYL/ROPIVACAINE/NS/PF 2-625MCG/1
15 PLASTIC BAG, INJECTION (ML) EPIDURAL ONCE
Status: COMPLETED | OUTPATIENT
Start: 2024-09-03 | End: 2024-09-03

## 2024-09-03 RX ORDER — METOPROLOL SUCCINATE 25 MG/1
12.5 TABLET, EXTENDED RELEASE ORAL DAILY
Status: DISCONTINUED | OUTPATIENT
Start: 2024-09-04 | End: 2024-09-03 | Stop reason: HOSPADM

## 2024-09-03 RX ADMIN — METOPROLOL SUCCINATE 25 MG: 25 TABLET, EXTENDED RELEASE ORAL at 08:07

## 2024-09-03 RX ADMIN — Medication 10 ML: at 08:12

## 2024-09-03 RX ADMIN — HEPARIN SODIUM 5000 UNITS: 5000 INJECTION INTRAVENOUS; SUBCUTANEOUS at 08:08

## 2024-09-03 RX ADMIN — POTASSIUM CHLORIDE 40 MEQ: 750 TABLET, EXTENDED RELEASE ORAL at 03:55

## 2024-09-03 RX ADMIN — POTASSIUM PHOSPHATE, MONOBASIC POTASSIUM PHOSPHATE, DIBASIC 15 MMOL: 224; 236 INJECTION, SOLUTION, CONCENTRATE INTRAVENOUS at 12:31

## 2024-09-03 RX ADMIN — SERTRALINE 50 MG: 50 TABLET, FILM COATED ORAL at 08:07

## 2024-09-03 RX ADMIN — FAMOTIDINE 20 MG: 20 TABLET, FILM COATED ORAL at 08:08

## 2024-09-03 RX ADMIN — ASPIRIN 81 MG: 81 TABLET, COATED ORAL at 08:08

## 2024-09-03 RX ADMIN — POTASSIUM CHLORIDE: 2 INJECTION, SOLUTION, CONCENTRATE INTRAVENOUS at 06:18

## 2024-09-03 NOTE — DISCHARGE SUMMARY
Patient Name: Saba Velez  : 1955  MRN: [id number]    Date of Admission: 2024  Date of Discharge:  9/3/2024  Primary Care Physician: Oscar Lawrence MD      Chief Complaint:   Nausea and Weight Loss      Discharge Diagnoses     Active Hospital Problems    Diagnosis  POA    **Hypokalemia [E87.6]  Yes    Severe malnutrition [E43]  Yes    Asymptomatic bacteriuria [R82.71]  Yes    Acute kidney injury [N17.9]  Yes    Obstructive sleep apnea [G47.33]  Yes    Weight loss [R63.4]  Yes    Early satiety [R68.81]  Yes      Resolved Hospital Problems   No resolved problems to display.        Hospital Course     Ms. Velez is a 69 y.o. female who presented to [facility] initially complaining of nausea and weight loss.  Please see the admitting history and physical for further details.  She was found to have severe hypokalemia and was admitted to the hospital for further evaluation and treatment.  She was seen by nephrology and given IV fluids and aggressive electrolyte replacement therapy.  With IV fluid she systemically feels a whole lot better.  Nephrology recommends stopping thiazide diuretic as well as NSAIDs at discharge.  She needs continued outpatient monitoring of her blood work.    She is being evaluated as outpatient for weight loss.  She had CT scan of her chest, abdomen and pelvis that was unrevealing for malignancy or etiology for weight loss.  Discussed with her about continued follow-up with PCP and consideration for bidirectional endoscopy. Cortisol level and TSH here was normal.    Patient had abnormal urinalysis on admission with eventual positive urine culture however she denies having any urinary symptoms prior to admission and still denies urinary symptoms at time of discharge.  This consistent with asymptomatic bacteriuria.  No indication for antibiotic.      Day of Discharge     Subjective:  See today's progress note.    Physical Exam:  Temp:  [97.9 °F (36.6 °C)-98.8  °F (37.1 °C)] 98.2 °F (36.8 °C)  Heart Rate:  [51-60] 60  Resp:  [16-18] 18  BP: ()/(66-78) 117/78  Body mass index is 41.59 kg/m².  Physical Exam    Consultants     Consult Orders (all) (From admission, onward)       Start     Ordered    09/01/24 1941  Inpatient Consult to Advance Care Planning  Once        Provider:  (Not yet assigned)    09/01/24 1940 09/01/24 1941  Inpatient Nutrition Consult  Once        Provider:  (Not yet assigned)    09/01/24 1940 09/01/24 1812  Inpatient Nephrology Consult  Once        Specialty:  Nephrology  Provider:  [place] MD    09/01/24 1811            Procedures     Imaging Results (All)       Procedure Component Value Units Date/Time    NM HIDA SCAN WITH PHARMACOLOGICAL INTERVENTION [[id number]] Collected: 09/02/24 1314     Updated: 09/02/24 1319    Narrative:      NM HIDA SCAN WITH PHARMACOLOGICAL INTERVENTION-     INDICATIONS: Nausea, early satiety     TECHNIQUE: HEPATOBILIARY SCINTIGRAPHY     COMPARISON: Correlated with ultrasound exam from 9/1/2024     FINDINGS:      Radiotracer: 5.8 mCi technetium 99m Choletec, intravenous.     Anterior projections of the abdomen were obtained following radiotracer  administration. There is prompt visualization of the gallbladder, common  biliary duct, and small bowel.     At 60 minutes, 2.2 mcg of cholecystokinin was administered  intravenously, with no reported symptomatology. Images were obtained at  1 minute intervals for 30 minutes, revealing obvious decrease in  gallbladder activity. Calculated gallbladder ejection fraction was 74 %.             Impression:            1. Prompt visualization of the gallbladder, which will exclude acute  cholecystitis in 95% of cases.  2. The gallbladder ejection fraction in the range of normal.        This report was finalized on 9/2/2024 1:15 PM by Dr. Gonzalo Valentine M.D on Workstation: [employer]       US Gallbladder [[id number]] Resulted: 09/01/24 1902     Updated: 09/01/24 1903    XR  Chest 1 View [[id number]] Collected: 09/01/24 1641     Updated: 09/01/24 1645    Narrative:      XR CHEST 1 VW-     HISTORY: 69-year-old female with shortness of breath. Nausea and weight  loss.     FINDINGS: Limited apical lordotic projection. No definite pneumonia or  CHF is seen. Follow-up with 2 views of the chest is recommended.     This report was finalized on 9/1/2024 4:42 PM by Dr. Altagracia Garcia M.D on  Workstation: [id number]                 Pertinent Labs     Results from last 7 days   Lab Units 09/02/24 0304 09/01/24  1524   WBC 10*3/mm3 8.40 7.94   HEMOGLOBIN g/dL 10.6* 12.0   PLATELETS 10*3/mm3 277 306     Results from last 7 days   Lab Units 09/03/24 0814 09/02/24 2357 09/02/24 1630 09/02/24  0304   SODIUM mmol/L 143 140 140 138   POTASSIUM mmol/L 3.9  3.9 3.4* 3.3*  2.7* 2.6*   CHLORIDE mmol/L 107 104 101 100   CO2 mmol/L 27.0 28.7 29.0 32.0*   BUN mg/dL 15 17 18 22   CREATININE mg/dL 1.01* 1.17* 1.18* 1.41*   GLUCOSE mg/dL 96 126* 111* 104*   EGFR mL/min/1.73 59.6* 50.0* 49.5* 40.0*     Results from last 7 days   Lab Units 09/03/24  0814 09/02/24 2357 09/02/24  1630 09/01/24  1524   ALBUMIN g/dL 3.1* 3.1* 3.2* 3.7   BILIRUBIN mg/dL  --   --   --  0.8   ALK PHOS U/L  --   --   --  66   AST (SGOT) U/L  --   --   --  35*   ALT (SGPT) U/L  --   --   --  25     Results from last 7 days   Lab Units 09/03/24 0814 09/02/24 2357 09/02/24 1630 09/02/24  0304 09/01/24  1524   CALCIUM mg/dL 8.8 8.7 9.0 8.8 9.5   ALBUMIN g/dL 3.1* 3.1* 3.2*  --  3.7   MAGNESIUM mg/dL  --   --   --  2.4 2.4   PHOSPHORUS mg/dL 2.1* 2.5  2.4* 2.0* 2.7  --      Results from last 7 days   Lab Units 09/01/24  1524   LIPASE U/L 13     Results from last 7 days   Lab Units 09/01/24 1831 09/01/24  1524   HSTROP T ng/L 28* 30*     Results from last 7 days   Lab Units 09/01/24 1956 09/01/24  1831   SODIUM UR mmol/L 38  --    CREATININE UR mg/dL 172.1  --    OSMOLALITY UR mOsm/kg 318  --    EOSINOPHIL SMEAR % EOS/100 Cells 0  --   "  URIC ACID mg/dL  --  12.2*         Invalid input(s): \"LDLCALC\"  Results from last 7 days   Lab Units 09/01/24  1533   URINECX  >100,000 CFU/mL Escherichia coli*         Test Results Pending at Discharge     Pending Labs       Order Current Status    Magnesium In process    Renal Function Panel In process            Discharge Details        Discharge Medications        Continue These Medications        Instructions Start Date   aspirin 81 MG EC tablet   1 tablet, Oral, Daily      Elderberry Immune Health Gummy 50-45-3.8 MG chewable tablet  Generic drug: Elderberry-Vitamin C-Zinc   Oral      famotidine 20 MG tablet  Commonly known as: PEPCID   20 mg, Oral, 3 times daily      metoprolol succinate XL 25 MG 24 hr tablet  Commonly known as: TOPROL-XL   1 tablet, Oral, Daily      multivitamin with minerals tablet tablet   1 tablet, Oral, Daily      sertraline 50 MG tablet  Commonly known as: ZOLOFT   1 tablet, Oral, Daily      VITAMIN C PO   Oral             Stop These Medications      hydroCHLOROthiazide 25 MG tablet     ibuprofen 800 MG tablet  Commonly known as: ADVIL,MOTRIN     methocarbamol 750 MG tablet  Commonly known as: ROBAXIN              No Known Allergies    Discharge Disposition:  Home or Self Care      Discharge Diet:  Diet Order   Procedures    Diet: Regular/House; Fluid Consistency: Thin (IDDSI 0)       Discharge Activity:   Activity Instructions       Activity as Tolerated              CODE STATUS:    Code Status and Medical Interventions: CPR (Attempt to Resuscitate); Full Support   Ordered at: 09/01/24 3388     Level Of Support Discussed With:    Patient     Code Status (Patient has no pulse and is not breathing):    CPR (Attempt to Resuscitate)     Medical Interventions (Patient has pulse or is breathing):    Full Support       No future appointments.  Additional Instructions for the Follow-ups that You Need to Schedule       Discharge Follow-up with PCP   As directed       Currently Documented PCP: "    Oscar Lawrence MD    PCP Phone Number:    [phone]     Follow Up Details: 1 to 2 weeks (or sooner if problems)               Follow-up Information       Oscar Lawrence MD Follow up on 9/10/2024.    Specialty: General Practice  Why: Appointment will be Tuesday, September 10, 2024 at 10:00am  Contact information:  [address]  [address]  [place] [id number] [phone]               Cari Singh MD .    Specialties: Family Medicine, Internal Medicine  Contact information:  [address]  [place] [address]  [facility] [id number] [phone]                             Additional Instructions for the Follow-ups that You Need to Schedule       Discharge Follow-up with PCP   As directed       Currently Documented PCP:    Oscar Lawrence MD    PCP Phone Number:    [phone]     Follow Up Details: 1 to 2 weeks (or sooner if problems)            Time Spent on Discharge:  Greater than 30 minutes      Sudarshan Posey MD  [facility]  09/03/24  12:27 EDT

## 2024-09-03 NOTE — CONSULTS
"Nutrition Services    Patient Name:  Saba Velez  YOB: 1955  MRN: [id number]  Admit Date:  9/1/2024    Assessment Date:  09/03/24    Summary: Nurse admission screen/ MST 5    69yoF admitted for hypokalemia, wt loss, acute UTI, weakness, chronic renal impairment, intractable nausea. Hx of HTN. Pt reports 65# wt loss since January. Reports her UBW is 305#. Noted 25-50% PO intake x 2 meals. Pt attributes her wt loss to stress after retiring to care for her mother who has a hx of dementia. States she is now trying to look for a job again but is unable to find one. Pt asking about support groups. Recommend she may benefit with discussing this with the  since this is not within the RD's scope of practice. Noted mild to moderate muscle wasting and fat loss per NFPE. Pt reports she is happy with her wt loss. Discussed appropriate wt loss and encouraged good PO intake. Gallbladder and HIDA scan results pending.  Labs: Cr 1.01, glu 59.6, P 2.1  Meds: pepcid, heparin    Patient meets ASPEN/AND criteria for nutrition diagnosis of severe malnutrition of chronic illness based on: 21% unintentional wt loss x 9 months, inadequate energy intake > 3 months, NFPE results    Plan/recs:  Novasource renal once daily to support PO intake (provides additional 475 kcal, 22 g protein if consumed)  Monitor and replace elytes   Encourage PO intakes.    CLINICAL NUTRITION ASSESSMENT      Reason for Assessment MST score 2+, Nurse Admission Screen     Diagnosis/Problem   Hypokalemia   Medical/Surgical History Past Medical History:   Diagnosis Date    Arthritis     Hypertension        Past Surgical History:   Procedure Laterality Date    ABDOMINAL SURGERY      JOINT REPLACEMENT      R hip 2015        Anthropometrics        Current Height  Current Weight  BMI kg/m2 Height: 162.6 cm (64\")  Weight: 110 kg (242 lb 4.6 oz) (09/03/24 0641)  Body mass index is 41.59 kg/m².   Adjusted BMI (if applicable)    BMI Category " "Obese, Class I (30 - 34.9)   Ideal Body Weight (IBW) 120#   Usual Body Weight (UBW) 305# per pt   Weight Trend Loss   Weight History Wt Readings from Last 30 Encounters:   09/03/24 0641 110 kg (242 lb 4.6 oz)   09/02/24 0422 108 kg (239 lb)   09/01/24 1801 109 kg (239 lb 6.7 oz)   09/01/24 1507 111 kg (244 lb)   10/22/15 1311 (!) 153 kg (337 lb 2 oz)   03/02/15 1345 132 kg (290 lb 4.8 oz)        Estimated Requirements         Weight used  110 kg    Calories  1210 - 1540 kcal (11-14 kcal/kg)    Protein  55 - 66 g (1.0 - 1.2 gm/kg) 54.7 kg IBW    Fluid   (1 mL/kcal)     Labs       Pertinent Labs    Results from last 7 days   Lab Units 09/03/24  0814 09/02/24  2357 09/02/24  1630 09/02/24  0304 09/01/24  1524   SODIUM mmol/L 143 140 140   < > 139   POTASSIUM mmol/L 3.9  3.9 3.4* 3.3*  2.7*   < > 2.2*   CHLORIDE mmol/L 107 104 101   < > 94*   CO2 mmol/L 27.0 28.7 29.0   < > 32.7*   BUN mg/dL 15 17 18   < > 25*   CREATININE mg/dL 1.01* 1.17* 1.18*   < > 1.82*   CALCIUM mg/dL 8.8 8.7 9.0   < > 9.5   BILIRUBIN mg/dL  --   --   --   --  0.8   ALK PHOS U/L  --   --   --   --  66   ALT (SGPT) U/L  --   --   --   --  25   AST (SGOT) U/L  --   --   --   --  35*   GLUCOSE mg/dL 96 126* 111*   < > 110*    < > = values in this interval not displayed.     Results from last 7 days   Lab Units 09/03/24  0814 09/02/24  1630 09/02/24  0304 09/01/24  1524   MAGNESIUM mg/dL  --   --  2.4 2.4   PHOSPHORUS mg/dL 2.1*   < > 2.7  --    HEMOGLOBIN g/dL  --   --  10.6* 12.0   HEMATOCRIT %  --   --  30.8* 34.5   WBC 10*3/mm3  --   --  8.40 7.94   ALBUMIN g/dL 3.1*   < >  --  3.7    < > = values in this interval not displayed.     Results from last 7 days   Lab Units 09/02/24  0304 09/01/24  1524   PLATELETS 10*3/mm3 277 306     No results found for: \"COVID19\"  No results found for: \"HGBA1C\"       Medications           Scheduled Medications aspirin, 81 mg, Oral, Daily  famotidine, 20 mg, Oral, BID AC  heparin (porcine), 5,000 Units, " Subcutaneous, Q12H  [START ON 9/4/2024] metoprolol succinate XL, 12.5 mg, Oral, Daily  potassium & sodium phosphates, 2 packet, Oral, Once  sertraline, 50 mg, Oral, Daily  sodium chloride, 10 mL, Intravenous, Q12H       Infusions lactated ringers 980 mL with potassium chloride 40 mEq infusion, , Last Rate: 100 mL/hr at 09/03/24 0618       PRN Medications   acetaminophen **OR** acetaminophen **OR** acetaminophen    senna-docusate sodium **AND** polyethylene glycol **AND** bisacodyl **AND** bisacodyl    Calcium Replacement - Follow Nurse / BPA Driven Protocol    Magnesium Low Dose Replacement - Follow Nurse / BPA Driven Protocol    nitroglycerin    ondansetron ODT **OR** ondansetron    Phosphorus Replacement - Follow Nurse / BPA Driven Protocol    Potassium Replacement - Follow Nurse / BPA Driven Protocol    Potassium Replacement - Follow Nurse / BPA Driven Protocol    sodium chloride    sodium chloride    sodium chloride     Physical Findings          General Findings alert, obese, oriented   Oral/Mouth Cavity WDL   Edema  no edema   Gastrointestinal WDL, last bowel movement: 9/3    Skin  skin intact   Tubes/Drains/Lines none   NFPE See Malnutrition Severity Assessment, Date Completed: 9/3     Malnutrition Severity Assessment      Patient meets criteria for : Severe Malnutrition  Malnutrition Type (Last 8 Hours)       Malnutrition Severity Assessment       Row Name 09/03/24 1052       Malnutrition Severity Assessment    Malnutrition Type Chronic Disease - Related Malnutrition      Row Name 09/03/24 1052       Insufficient Energy Intake     Insufficient Energy Intake Findings Severe    Insufficient Energy Intake  <75% of est. energy requirement for > or equal to 3 months      Row Name 09/03/24 1052       Unintentional Weight Loss     Unintentional Weight Loss Findings Severe    Unintentional Weight Loss  Weight loss greater than 10% in six months      Row Name 09/03/24 1052       Muscle Loss    Loss of Muscle Mass  Findings Moderate    Mosque Region Moderate - slight depression    Clavicle Bone Region None    Acromion Bone Region None    Scapular Bone Region None    Dorsal Hand Region Moderate - slight depression    Patellar Region Moderate - patella more prominent, less muscle definition around patella    Anterior Thigh Region None    Posterior Calf Region Moderate - some roundness, slight firmness      Row Name 09/03/24 1052       Fat Loss    Subcutaneous Fat Loss Findings Moderate    Orbital Region  Moderate -  somewhat hollowness, slightly dark circles    Upper Arm Region Moderate - some fat tissue, not ample    Thoracic & Lumbar Region None      Row Name 09/03/24 1052       Criteria Met (Must meet criteria for severity in at least 2 of these categories: M Wasting, Fat Loss, Fluid, Secondary Signs, Wt. Status, Intake)    Patient meets criteria for  Severe Malnutrition                       Current Nutrition Orders & Evaluation of Intake       Oral Nutrition     Food Allergies NKFA   Current PO Diet Diet: Regular/House; Fluid Consistency: Thin (IDDSI 0)   Supplement n/a   PO Evaluation     % PO Intake 25-50%    Factors Affecting Intake: decreased appetite, emotional status, Other: stress   --  PES STATEMENT / NUTRITION DIAGNOSIS      Nutrition Dx Problem  Problem: Malnutrition (severe)  Etiology: Factors Affecting Nutrition - decreased appetite and stress    Signs/Symptoms: Report of Minimal PO Intake, NFPE Results, Unintended Weight Change, Report/Observation, and Biochemical     NUTRITION INTERVENTION / PLAN OF CARE      Intervention Goal(s) Establish goals of care, Reduce/improve symptoms, Meet estimated needs, Disease management/therapy, Establish PO intake, Accepts oral nutrition supplement, Appropriate weight loss, and PO intake goal %: >75%         RD Intervention/Action Encourage intake, Continue to monitor, Care plan reviewed, and Recommend/order: NS renal    --      Prescription/Orders:       PO Diet        Supplements NS renal once daily      Enteral Nutrition       Parenteral Nutrition    New Prescription Ordered? Yes   --      Monitor/Evaluation Per protocol   Discharge Plan/Needs Pending clinical course   --    RD to follow per protocol.      Electronically signed by:  Maria De Jesus Escobar RD  09/03/24 10:30 EDT

## 2024-09-03 NOTE — PROGRESS NOTES
"    Name: Saba Velez ADMIT: 2024   : 1955  PCP: Oscar Lawrence MD    MRN: [id number] LOS: 1 days   AGE/SEX: 69 y.o. female  ROOM: [facility]     Subjective   Subjective   \"I feel good.\"  Had a loose stool this morning.  No other specific complaints.       Objective   Objective   Vital Signs  Temp:  [97.9 °F (36.6 °C)-98.8 °F (37.1 °C)] 98.2 °F (36.8 °C)  Heart Rate:  [51-60] 60  Resp:  [16-18] 18  BP: ()/(62-78) 117/78  SpO2:  [97 %-100 %] 97 %  on   ;   Device (Oxygen Therapy): room air  Body mass index is 41.59 kg/m².  Physical Exam  Vitals and nursing note reviewed.   Constitutional:       General: She is not in acute distress.     Appearance: She is obese.   Cardiovascular:      Rate and Rhythm: Normal rate and regular rhythm.   Pulmonary:      Effort: Pulmonary effort is normal.      Breath sounds: Normal breath sounds.   Abdominal:      General: Bowel sounds are normal.      Palpations: Abdomen is soft.      Tenderness: There is no abdominal tenderness.   Musculoskeletal:         General: No swelling.   Skin:     General: Skin is warm and dry.   Neurological:      Mental Status: She is alert. Mental status is at baseline.       Results Review     I reviewed the patient's new clinical results.  Results from last 7 days   Lab Units 24  0304 24  1524   WBC 10*3/mm3 8.40 7.94   HEMOGLOBIN g/dL 10.6* 12.0   PLATELETS 10*3/mm3 277 306     Results from last 7 days   Lab Units 24  0814 24  2357 24  1630 24  0304   SODIUM mmol/L 143 140 140 138   POTASSIUM mmol/L 3.9  3.9 3.4* 3.3*  2.7* 2.6*   CHLORIDE mmol/L 107 104 101 100   CO2 mmol/L 27.0 28.7 29.0 32.0*   BUN mg/dL 15 17 18 22   CREATININE mg/dL 1.01* 1.17* 1.18* 1.41*   GLUCOSE mg/dL 96 126* 111* 104*   EGFR mL/min/1.73 59.6* 50.0* 49.5* 40.0*     Results from last 7 days   Lab Units 24  0814 24  2357 24  1630 24  1524   ALBUMIN g/dL 3.1* 3.1* 3.2* 3.7   BILIRUBIN mg/dL  --   -- " "  --  0.8   ALK PHOS U/L  --   --   --  66   AST (SGOT) U/L  --   --   --  35*   ALT (SGPT) U/L  --   --   --  25     Results from last 7 days   Lab Units 09/03/24  0814 09/02/24  2357 09/02/24  1630 09/02/24  0304 09/01/24  1524   CALCIUM mg/dL 8.8 8.7 9.0 8.8 9.5   ALBUMIN g/dL 3.1* 3.1* 3.2*  --  3.7   MAGNESIUM mg/dL  --   --   --  2.4 2.4   PHOSPHORUS mg/dL 2.1* 2.5  2.4* 2.0* 2.7  --      Results from last 7 days   Lab Units 09/01/24  1524   LACTATE mmol/L 1.6     No results found for: \"HGBA1C\", \"POCGLU\"    NM HIDA SCAN WITH PHARMACOLOGICAL INTERVENTION    Result Date: 9/2/2024    1. Prompt visualization of the gallbladder, which will exclude acute cholecystitis in 95% of cases. 2. The gallbladder ejection fraction in the range of normal.   This report was finalized on 9/2/2024 1:15 PM by Dr. Gonzalo Valentine M.D on Workstation: [email or web address]       I have personally reviewed all medications:  Scheduled Medications  aspirin, 81 mg, Oral, Daily  famotidine, 20 mg, Oral, BID AC  heparin (porcine), 5,000 Units, Subcutaneous, Q12H  [START ON 9/4/2024] metoprolol succinate XL, 12.5 mg, Oral, Daily  potassium phosphate, 15 mmol, Intravenous, Once  sertraline, 50 mg, Oral, Daily  sodium chloride, 10 mL, Intravenous, Q12H    Infusions     Diet  Diet: Regular/House; Fluid Consistency: Thin (IDDSI 0)    I have personally reviewed:  [x]  Laboratory   [x]  Microbiology   [x]  Radiology   [x]  EKG/Telemetry  [x]  Cardiology/Vascular   []  Pathology    []  Records       Assessment/Plan     Active Hospital Problems    Diagnosis  POA    **Hypokalemia [E87.6]  Yes    Asymptomatic bacteriuria [R82.71]  Yes    Acute kidney injury [N17.9]  Yes    Obstructive sleep apnea [G47.33]  Yes    Weight loss [R63.4]  Yes    Early satiety [R68.81]  Yes      Resolved Hospital Problems   No resolved problems to display.       69 y.o. female admitted with Hypokalemia.    Reviewed again CT findings from 8/30.  Oral contrast was used however IV " contrast was not.  This seems to have been a relatively good study.  Her weight loss has been ongoing for almost a year now.  It seems if there were a malignancy significant enough to cause such longstanding weight loss it would have been easily identifiable.  Nonetheless she has not had endoscopy in quite some time and likely would be indicated for outpatient bidirectional endoscopy.    Nephrology following regarding recovering renal failure and electrolyte abnormalities.  Labs seem better though labs from this morning pending.  Seems to be tolerating a diet.       Heparin SC for DVT prophylaxis.  Full code.  Discussed with patient and consulting provider.  Anticipate discharge home later today or tomorrow.  Expected Discharge Date: 9/4/2024; Expected Discharge Time:       Sudarshan Posey MD  [facility]  09/03/24  12:02 EDT

## 2024-09-03 NOTE — CASE MANAGEMENT/SOCIAL WORK
Discharge Planning Assessment  [facility]     Patient Name: Saba Velez  MRN: [id number]  Today's Date: 9/3/2024    Admit Date: 9/1/2024    Plan: Home   Discharge Needs Assessment       Row Name 09/03/24 1325       Living Environment    People in Home child(candis), adult    Current Living Arrangements home    Potentially Unsafe Housing Conditions none    Primary Care Provided by self    Provides Primary Care For no one    Family Caregiver if Needed child(candis), adult    Quality of Family Relationships supportive;helpful    Able to Return to Prior Arrangements yes       Resource/Environmental Concerns    Resource/Environmental Concerns none       Transition Planning    Patient/Family Anticipates Transition to home    Patient/Family Anticipated Services at Transition none    Transportation Anticipated family or friend will provide;car, drives self       Discharge Needs Assessment    Readmission Within the Last 30 Days no previous admission in last 30 days    Equipment Currently Used at Home none    Concerns to be Addressed denies needs/concerns at this time    Anticipated Changes Related to Illness none    Equipment Needed After Discharge none    Provided Post Acute Provider List? N/A    Provided Post Acute Provider Quality & Resource List? N/A                   Discharge Plan       Row Name 09/03/24 6741       Plan    Plan Home    Plan Comments S/W pt at bedside.  Facesheet info confirmed.  Pt lives in a house w/ her son Rudolph, is IADLs and can drive.  She does not use any home DME and no hx of HH or SNF.  Pt plans to return home upon DC and denies any DC needs at this time.  She states she will have transport home upon DC. ............Chika ESPINOZA/ JENNIFER                  Continued Care and Services - Admitted Since 9/1/2024    No active coordination exists for this encounter.       Expected Discharge Date and Time       Expected Discharge Date Expected Discharge Time    Sep 3, 2024            Demographic Summary        Row Name 09/03/24 1325       General Information    Admission Type inpatient    Arrived From home    Required Notices Provided Important Message from Medicare    Referral Source admission list    Reason for Consult discharge planning    Preferred Language English                   Functional Status       Row Name 09/03/24 1325       Functional Status    Usual Activity Tolerance good       Functional Status, IADL    Medications independent    Meal Preparation independent    Housekeeping independent    Laundry independent    Shopping independent       Mental Status    General Appearance WDL WDL       Mental Status Summary    Recent Changes in Mental Status/Cognitive Functioning no changes                             Chika Medeiros, RN

## 2024-09-03 NOTE — PROGRESS NOTES
[facility] Progress Note      Patient Name: Saba Velez  : 1955  MRN: [id number]  Primary Care Physician:  Oscar Lawrence MD  Date of admission: 2024    Subjective     Interval History:   Follow-up hypokalemia, JACQUI.  Phosphorus low this morning.  Received oral phosphorus yesterday.  Ordered again this morning per protocol but patient having diarrhea.  Eating better.  Feels stronger.  Drinking well.    Review of Systems:   As noted above    Objective     Vitals:   Temp:  [97.9 °F (36.6 °C)-98.8 °F (37.1 °C)] 98.2 °F (36.8 °C)  Heart Rate:  [51-60] 57  Resp:  [16-18] 18  BP: ()/(62-78) 117/78    Intake/Output Summary (Last 24 hours) at 9/3/2024 1045  Last data filed at 9/3/2024 0831  Gross per 24 hour   Intake 920 ml   Output --   Net 920 ml       Physical Exam:    General Appearance: alert, oriented x 3, no acute distress   Skin: warm and dry  HEENT: oral mucosa normal, nonicteric sclera  Neck: supple, no JVD  Lungs: Clear to auscultation bilaterally.  Heart: RRR, normal S1 and S2  Abdomen: soft, nontender, nondistended. +bs  : no palpable bladder  Extremities: no edema.  Neuro: normal speech and mental status     Scheduled Meds:     aspirin, 81 mg, Oral, Daily  famotidine, 20 mg, Oral, BID AC  heparin (porcine), 5,000 Units, Subcutaneous, Q12H  [START ON 2024] metoprolol succinate XL, 12.5 mg, Oral, Daily  potassium & sodium phosphates, 2 packet, Oral, Once  sertraline, 50 mg, Oral, Daily  sodium chloride, 10 mL, Intravenous, Q12H      IV Meds:   lactated ringers 980 mL with potassium chloride 40 mEq infusion, , Last Rate: 100 mL/hr at 24 0618        Results Reviewed:   I have personally reviewed the results from the time of this admission to 9/3/2024 10:45 EDT     Results from last 7 days   Lab Units 24  0814 24  2357 24  1630 24  0304 24  1524   SODIUM mmol/L 143 140 140   < > 139   POTASSIUM mmol/L 3.9  3.9 3.4*  3.3*  2.7*   < > 2.2*   CHLORIDE mmol/L 107 104 101   < > 94*   CO2 mmol/L 27.0 28.7 29.0   < > 32.7*   BUN mg/dL 15 17 18   < > 25*   CREATININE mg/dL 1.01* 1.17* 1.18*   < > 1.82*   CALCIUM mg/dL 8.8 8.7 9.0   < > 9.5   BILIRUBIN mg/dL  --   --   --   --  0.8   ALK PHOS U/L  --   --   --   --  66   ALT (SGPT) U/L  --   --   --   --  25   AST (SGOT) U/L  --   --   --   --  35*   GLUCOSE mg/dL 96 126* 111*   < > 110*    < > = values in this interval not displayed.       Estimated Creatinine Clearance: 61.9 mL/min (A) (by C-G formula based on SCr of 1.01 mg/dL (H)).    Results from last 7 days   Lab Units 09/03/24  0814 09/02/24  2357 09/02/24  1630 09/02/24  0304 09/02/24  0304 09/01/24  1524   MAGNESIUM mg/dL  --   --   --   --  2.4 2.4   PHOSPHORUS mg/dL 2.1* 2.5  2.4* 2.0*   < > 2.7  --     < > = values in this interval not displayed.       Results from last 7 days   Lab Units 09/01/24  1831   URIC ACID mg/dL 12.2*       Results from last 7 days   Lab Units 09/02/24  0304 09/01/24  1524   WBC 10*3/mm3 8.40 7.94   HEMOGLOBIN g/dL 10.6* 12.0   PLATELETS 10*3/mm3 277 306             Assessment / Plan     ASSESSMENT:  Hypokalemia.  Improved.  Renal function also improved.  Her phosphorus is slightly low.  Will replace it IV as she is having loose stools.  With her relative hypotension and weight loss would think about adrenal insufficiency however would expect her to be hyper rather than hypokalemic.  2.  Nausea vomiting and unintentional  weight loss.  Noncontrast CT of the abdomen 8/29/2024 as an outpatient did not demonstrate any clear etiology.  Gallbladder with cholelithiasis and sludge.  HIDA scan here negative.  3.  Anemia.    PLAN:  1`. Check Cortisol for completeness.  2.  Check urine potassium on existing specimen.  3. Discussed with Dr. Posey.  4.Replace phosphorus IV  5.  Stop IV fluids.  6.  If her chemistries are okay this afternoon on repeat, she could potentially be discharged home from my  standpoint.  Thank you for involving us in the care of Saba Velez.  Please feel free to call with any questions.    Fiona River MD  09/03/24  10:45 EDT    [facility]  [phone]    Please note that portions of this note were completed with a voice recognition program.

## 2024-09-04 ENCOUNTER — READMISSION MANAGEMENT (OUTPATIENT)
Dept: CALL CENTER | Facility: HOSPITAL | Age: 69
End: 2024-09-04
Payer: MEDICARE

## 2024-09-04 NOTE — OUTREACH NOTE
Prep Survey      Flowsheet Row Responses   [facility]? [facility]   Is LACE score < 7 ? No   Eligibility Readm Mgmt   Discharge diagnosis Hypokalemia   Does the patient have one of the following disease processes/diagnoses(primary or secondary)? Other   Does the patient have Home health ordered? No   Is there a DME ordered? No   Medication alerts for this patient see avs   Prep survey completed? Yes            Evelyne ESPINOSA - Registered Nurse

## 2024-09-06 ENCOUNTER — READMISSION MANAGEMENT (OUTPATIENT)
Dept: CALL CENTER | Facility: HOSPITAL | Age: 69
End: 2024-09-06
Payer: MEDICARE

## 2024-09-06 NOTE — OUTREACH NOTE
Medical Week 1 Survey      Flowsheet Row Responses   [facility] patient discharged from? [facility]   Does the patient have one of the following disease processes/diagnoses(primary or secondary)? Other   Week 1 attempt successful? Yes   Call start time 1552   Call end time 1553   Discharge diagnosis Hypokalemia   Meds reviewed with patient/caregiver? Yes   Is the patient having any side effects they believe may be caused by any medication additions or changes? No   Does the patient have all medications ordered at discharge? Yes   Is the patient taking all medications as directed (includes completed medication regime)? Yes   Does the patient have a primary care provider?  Yes   Does the patient have an appointment with their PCP within 7 days of discharge? Yes   Has the patient kept scheduled appointments due by today? N/A   Has home health visited the patient within 72 hours of discharge? N/A   Psychosocial issues? No   Did the patient receive a copy of their discharge instructions? Yes   Nursing interventions Reviewed instructions with patient   What is the patient's perception of their health status since discharge? Improving   Is the patient/caregiver able to teach back signs and symptoms related to disease process for when to call PCP? Yes   Is the patient/caregiver able to teach back signs and symptoms related to disease process for when to call 911? Yes   Is the patient/caregiver able to teach back the hierarchy of who to call/visit for symptoms/problems? PCP, Specialist, Home health nurse, Urgent Care, ED, 911 Yes   Week 1 call completed? Yes   Graduated Yes   Graduated/Revoked comments Pt reports she is feeling much better. No needs.   Call end time 1553            PA ESPINOSA - Registered Nurse